# Patient Record
Sex: MALE | Race: WHITE | Employment: OTHER | URBAN - METROPOLITAN AREA
[De-identification: names, ages, dates, MRNs, and addresses within clinical notes are randomized per-mention and may not be internally consistent; named-entity substitution may affect disease eponyms.]

---

## 2017-01-24 ENCOUNTER — NURSE ONLY (OUTPATIENT)
Dept: FAMILY MEDICINE CLINIC | Age: 67
End: 2017-01-24

## 2017-01-24 DIAGNOSIS — E53.8 B12 DEFICIENCY: Primary | ICD-10-CM

## 2017-01-24 PROCEDURE — 96372 THER/PROPH/DIAG INJ SC/IM: CPT | Performed by: FAMILY MEDICINE

## 2017-01-24 RX ORDER — CYANOCOBALAMIN 1000 UG/ML
1000 INJECTION INTRAMUSCULAR; SUBCUTANEOUS ONCE
Status: COMPLETED | OUTPATIENT
Start: 2017-01-24 | End: 2017-01-24

## 2017-01-24 RX ADMIN — CYANOCOBALAMIN 1000 MCG: 1000 INJECTION INTRAMUSCULAR; SUBCUTANEOUS at 12:50

## 2017-01-25 RX ORDER — HYDROCODONE BITARTRATE AND ACETAMINOPHEN 10; 325 MG/1; MG/1
TABLET ORAL
Qty: 120 TABLET | Refills: 0 | Status: SHIPPED | OUTPATIENT
Start: 2017-01-25 | End: 2017-02-16 | Stop reason: SDUPTHER

## 2017-01-27 RX ORDER — TIZANIDINE HYDROCHLORIDE 4 MG/1
CAPSULE, GELATIN COATED ORAL
Qty: 30 CAPSULE | Refills: 0 | Status: SHIPPED | OUTPATIENT
Start: 2017-01-27 | End: 2017-03-23 | Stop reason: SDUPTHER

## 2017-02-01 ENCOUNTER — TELEPHONE (OUTPATIENT)
Dept: FAMILY MEDICINE CLINIC | Age: 67
End: 2017-02-01

## 2017-02-16 ENCOUNTER — OFFICE VISIT (OUTPATIENT)
Dept: FAMILY MEDICINE CLINIC | Age: 67
End: 2017-02-16

## 2017-02-16 VITALS
HEIGHT: 71 IN | RESPIRATION RATE: 18 BRPM | WEIGHT: 176 LBS | TEMPERATURE: 97.5 F | HEART RATE: 78 BPM | BODY MASS INDEX: 24.64 KG/M2 | SYSTOLIC BLOOD PRESSURE: 132 MMHG | OXYGEN SATURATION: 97 % | DIASTOLIC BLOOD PRESSURE: 72 MMHG

## 2017-02-16 DIAGNOSIS — G89.29 CHRONIC MIDLINE LOW BACK PAIN WITHOUT SCIATICA: Primary | ICD-10-CM

## 2017-02-16 DIAGNOSIS — M54.50 CHRONIC BILATERAL LOW BACK PAIN WITHOUT SCIATICA: ICD-10-CM

## 2017-02-16 DIAGNOSIS — I10 ESSENTIAL HYPERTENSION: ICD-10-CM

## 2017-02-16 DIAGNOSIS — M54.50 CHRONIC MIDLINE LOW BACK PAIN WITHOUT SCIATICA: Primary | ICD-10-CM

## 2017-02-16 DIAGNOSIS — G89.29 CHRONIC BILATERAL LOW BACK PAIN WITHOUT SCIATICA: ICD-10-CM

## 2017-02-16 DIAGNOSIS — F41.9 CHRONIC ANXIETY: ICD-10-CM

## 2017-02-16 DIAGNOSIS — S88.912S: ICD-10-CM

## 2017-02-16 DIAGNOSIS — E53.8 B12 DEFICIENCY: ICD-10-CM

## 2017-02-16 PROCEDURE — 99214 OFFICE O/P EST MOD 30 MIN: CPT | Performed by: FAMILY MEDICINE

## 2017-02-16 RX ORDER — HYDROCODONE BITARTRATE AND ACETAMINOPHEN 10; 325 MG/1; MG/1
TABLET ORAL
Qty: 120 TABLET | Refills: 0 | Status: SHIPPED | OUTPATIENT
Start: 2017-02-16 | End: 2017-03-21 | Stop reason: SDUPTHER

## 2017-02-16 RX ORDER — PRASUGREL HCL 10 MG
TABLET ORAL
Qty: 30 TABLET | Refills: 0 | Status: SHIPPED | OUTPATIENT
Start: 2017-02-16 | End: 2017-03-23 | Stop reason: HOSPADM

## 2017-02-16 ASSESSMENT — ENCOUNTER SYMPTOMS
ALLERGIC/IMMUNOLOGIC NEGATIVE: 1
GASTROINTESTINAL NEGATIVE: 1
EYES NEGATIVE: 1
RESPIRATORY NEGATIVE: 1
SHORTNESS OF BREATH: 0

## 2017-02-17 ENCOUNTER — TELEPHONE (OUTPATIENT)
Dept: FAMILY MEDICINE CLINIC | Age: 67
End: 2017-02-17

## 2017-02-20 ENCOUNTER — TELEPHONE (OUTPATIENT)
Dept: FAMILY MEDICINE CLINIC | Age: 67
End: 2017-02-20

## 2017-02-20 RX ORDER — TIZANIDINE 4 MG/1
4 TABLET ORAL 3 TIMES DAILY
Qty: 60 TABLET | Refills: 3 | Status: SHIPPED | OUTPATIENT
Start: 2017-02-20 | End: 2018-03-27 | Stop reason: ALTCHOICE

## 2017-03-09 ENCOUNTER — TELEPHONE (OUTPATIENT)
Dept: FAMILY MEDICINE CLINIC | Age: 67
End: 2017-03-09

## 2017-03-09 RX ORDER — VARDENAFIL HYDROCHLORIDE 10 MG/1
TABLET, FILM COATED ORAL
Qty: 30 TABLET | Refills: 3 | Status: SHIPPED | OUTPATIENT
Start: 2017-03-09 | End: 2017-03-21 | Stop reason: SDUPTHER

## 2017-03-21 ENCOUNTER — NURSE ONLY (OUTPATIENT)
Dept: FAMILY MEDICINE CLINIC | Age: 67
End: 2017-03-21

## 2017-03-21 DIAGNOSIS — E53.8 B12 DEFICIENCY: Primary | ICD-10-CM

## 2017-03-21 PROCEDURE — 96372 THER/PROPH/DIAG INJ SC/IM: CPT | Performed by: FAMILY MEDICINE

## 2017-03-21 RX ORDER — VARDENAFIL HYDROCHLORIDE 10 MG/1
TABLET ORAL
Qty: 6 TABLET | Refills: 3 | Status: SHIPPED | OUTPATIENT
Start: 2017-03-21 | End: 2017-04-06 | Stop reason: SDUPTHER

## 2017-03-21 RX ORDER — CYANOCOBALAMIN 1000 UG/ML
1000 INJECTION INTRAMUSCULAR; SUBCUTANEOUS ONCE
Status: COMPLETED | OUTPATIENT
Start: 2017-03-21 | End: 2017-03-21

## 2017-03-21 RX ORDER — HYDROCODONE BITARTRATE AND ACETAMINOPHEN 10; 325 MG/1; MG/1
TABLET ORAL
Qty: 120 TABLET | Refills: 0 | Status: SHIPPED | OUTPATIENT
Start: 2017-03-21 | End: 2017-04-06 | Stop reason: SDUPTHER

## 2017-03-21 RX ADMIN — CYANOCOBALAMIN 1000 MCG: 1000 INJECTION INTRAMUSCULAR; SUBCUTANEOUS at 11:03

## 2017-03-23 ENCOUNTER — OFFICE VISIT (OUTPATIENT)
Dept: CARDIOLOGY | Age: 67
End: 2017-03-23

## 2017-03-23 VITALS
BODY MASS INDEX: 26.1 KG/M2 | HEIGHT: 71 IN | DIASTOLIC BLOOD PRESSURE: 96 MMHG | HEART RATE: 66 BPM | SYSTOLIC BLOOD PRESSURE: 148 MMHG | WEIGHT: 186.4 LBS

## 2017-03-23 DIAGNOSIS — E78.00 ELEVATED CHOLESTEROL: ICD-10-CM

## 2017-03-23 DIAGNOSIS — E78.00 HYPERCHOLESTEREMIA: ICD-10-CM

## 2017-03-23 DIAGNOSIS — I10 ESSENTIAL HYPERTENSION: Primary | ICD-10-CM

## 2017-03-23 DIAGNOSIS — I25.10 CORONARY ARTERY DISEASE INVOLVING NATIVE CORONARY ARTERY OF NATIVE HEART WITHOUT ANGINA PECTORIS: ICD-10-CM

## 2017-03-23 PROCEDURE — 93000 ELECTROCARDIOGRAM COMPLETE: CPT | Performed by: INTERNAL MEDICINE

## 2017-03-23 PROCEDURE — 99213 OFFICE O/P EST LOW 20 MIN: CPT | Performed by: INTERNAL MEDICINE

## 2017-03-23 RX ORDER — PRASUGREL 10 MG/1
10 TABLET, FILM COATED ORAL DAILY
Qty: 30 TABLET | Refills: 6 | Status: CANCELLED | OUTPATIENT
Start: 2017-03-23

## 2017-04-06 ENCOUNTER — NURSE ONLY (OUTPATIENT)
Dept: FAMILY MEDICINE CLINIC | Age: 67
End: 2017-04-06

## 2017-04-06 DIAGNOSIS — E53.8 B12 DEFICIENCY: Primary | ICD-10-CM

## 2017-04-06 PROCEDURE — 96372 THER/PROPH/DIAG INJ SC/IM: CPT | Performed by: FAMILY MEDICINE

## 2017-04-06 RX ORDER — CYANOCOBALAMIN 1000 UG/ML
1000 INJECTION INTRAMUSCULAR; SUBCUTANEOUS ONCE
Status: COMPLETED | OUTPATIENT
Start: 2017-04-06 | End: 2017-04-06

## 2017-04-06 RX ORDER — VARDENAFIL HYDROCHLORIDE 10 MG/1
TABLET ORAL
Qty: 6 TABLET | Refills: 3 | Status: SHIPPED | OUTPATIENT
Start: 2017-04-06 | End: 2017-11-06 | Stop reason: SDUPTHER

## 2017-04-06 RX ORDER — HYDROCODONE BITARTRATE AND ACETAMINOPHEN 10; 325 MG/1; MG/1
TABLET ORAL
Qty: 120 TABLET | Refills: 0 | Status: SHIPPED | OUTPATIENT
Start: 2017-04-06 | End: 2017-04-27 | Stop reason: SDUPTHER

## 2017-04-06 RX ADMIN — CYANOCOBALAMIN 1000 MCG: 1000 INJECTION INTRAMUSCULAR; SUBCUTANEOUS at 14:53

## 2017-05-01 RX ORDER — HYDROCODONE BITARTRATE AND ACETAMINOPHEN 10; 325 MG/1; MG/1
TABLET ORAL
Qty: 120 TABLET | Refills: 0 | Status: SHIPPED | OUTPATIENT
Start: 2017-05-01 | End: 2017-05-30 | Stop reason: SDUPTHER

## 2017-05-04 ENCOUNTER — NURSE ONLY (OUTPATIENT)
Dept: FAMILY MEDICINE CLINIC | Age: 67
End: 2017-05-04

## 2017-05-04 DIAGNOSIS — E53.8 B12 DEFICIENCY: Primary | ICD-10-CM

## 2017-05-04 PROCEDURE — 96372 THER/PROPH/DIAG INJ SC/IM: CPT | Performed by: FAMILY MEDICINE

## 2017-05-04 RX ORDER — CYANOCOBALAMIN 1000 UG/ML
1000 INJECTION INTRAMUSCULAR; SUBCUTANEOUS ONCE
Status: COMPLETED | OUTPATIENT
Start: 2017-05-04 | End: 2017-05-04

## 2017-05-04 RX ADMIN — CYANOCOBALAMIN 1000 MCG: 1000 INJECTION INTRAMUSCULAR; SUBCUTANEOUS at 10:26

## 2017-05-20 RX ORDER — HYDROCODONE BITARTRATE AND ACETAMINOPHEN 10; 325 MG/1; MG/1
TABLET ORAL
Qty: 120 TABLET | Refills: 0 | Status: CANCELLED | OUTPATIENT
Start: 2017-05-20

## 2017-05-26 ENCOUNTER — NURSE ONLY (OUTPATIENT)
Dept: FAMILY MEDICINE CLINIC | Age: 67
End: 2017-05-26

## 2017-05-26 DIAGNOSIS — E53.8 B12 DEFICIENCY: Primary | ICD-10-CM

## 2017-05-26 PROCEDURE — 96372 THER/PROPH/DIAG INJ SC/IM: CPT | Performed by: FAMILY MEDICINE

## 2017-05-26 RX ORDER — CYANOCOBALAMIN 1000 UG/ML
1000 INJECTION INTRAMUSCULAR; SUBCUTANEOUS ONCE
Status: COMPLETED | OUTPATIENT
Start: 2017-05-26 | End: 2017-05-26

## 2017-05-26 RX ADMIN — CYANOCOBALAMIN 1000 MCG: 1000 INJECTION INTRAMUSCULAR; SUBCUTANEOUS at 15:59

## 2017-05-30 ENCOUNTER — OFFICE VISIT (OUTPATIENT)
Dept: FAMILY MEDICINE CLINIC | Age: 67
End: 2017-05-30

## 2017-05-30 VITALS
SYSTOLIC BLOOD PRESSURE: 132 MMHG | HEIGHT: 71 IN | DIASTOLIC BLOOD PRESSURE: 66 MMHG | BODY MASS INDEX: 25.9 KG/M2 | TEMPERATURE: 97.2 F | WEIGHT: 185 LBS | HEART RATE: 72 BPM | RESPIRATION RATE: 16 BRPM

## 2017-05-30 DIAGNOSIS — F32.A DEPRESSION, UNSPECIFIED DEPRESSION TYPE: ICD-10-CM

## 2017-05-30 DIAGNOSIS — G62.9 NEUROPATHY: Primary | ICD-10-CM

## 2017-05-30 DIAGNOSIS — G89.29 CHRONIC BILATERAL LOW BACK PAIN WITHOUT SCIATICA: ICD-10-CM

## 2017-05-30 DIAGNOSIS — I10 ESSENTIAL HYPERTENSION: ICD-10-CM

## 2017-05-30 DIAGNOSIS — E78.00 HYPERCHOLESTEREMIA: ICD-10-CM

## 2017-05-30 DIAGNOSIS — F41.9 CHRONIC ANXIETY: ICD-10-CM

## 2017-05-30 DIAGNOSIS — M54.50 CHRONIC BILATERAL LOW BACK PAIN WITHOUT SCIATICA: ICD-10-CM

## 2017-05-30 PROCEDURE — 99213 OFFICE O/P EST LOW 20 MIN: CPT | Performed by: FAMILY MEDICINE

## 2017-05-30 RX ORDER — HYDROCODONE BITARTRATE AND ACETAMINOPHEN 10; 325 MG/1; MG/1
1 TABLET ORAL EVERY 6 HOURS PRN
Qty: 120 TABLET | Refills: 0 | Status: SHIPPED | OUTPATIENT
Start: 2017-05-30 | End: 2017-06-27 | Stop reason: SDUPTHER

## 2017-05-30 ASSESSMENT — ENCOUNTER SYMPTOMS
RESPIRATORY NEGATIVE: 1
SHORTNESS OF BREATH: 0
GASTROINTESTINAL NEGATIVE: 1
ALLERGIC/IMMUNOLOGIC NEGATIVE: 1
EYES NEGATIVE: 1

## 2017-05-31 ENCOUNTER — TELEPHONE (OUTPATIENT)
Dept: FAMILY MEDICINE CLINIC | Age: 67
End: 2017-05-31

## 2017-06-27 DIAGNOSIS — G89.29 CHRONIC BILATERAL LOW BACK PAIN WITHOUT SCIATICA: ICD-10-CM

## 2017-06-27 DIAGNOSIS — M54.50 CHRONIC BILATERAL LOW BACK PAIN WITHOUT SCIATICA: ICD-10-CM

## 2017-06-27 DIAGNOSIS — G62.9 NEUROPATHY: ICD-10-CM

## 2017-06-28 RX ORDER — HYDROCODONE BITARTRATE AND ACETAMINOPHEN 10; 325 MG/1; MG/1
1 TABLET ORAL EVERY 6 HOURS PRN
Qty: 120 TABLET | Refills: 0 | Status: SHIPPED | OUTPATIENT
Start: 2017-06-28 | End: 2017-07-17 | Stop reason: SDUPTHER

## 2017-06-29 PROBLEM — Z79.899 CONTROLLED SUBSTANCE AGREEMENT SIGNED: Status: ACTIVE | Noted: 2017-06-29

## 2017-06-30 ENCOUNTER — NURSE ONLY (OUTPATIENT)
Dept: FAMILY MEDICINE CLINIC | Age: 67
End: 2017-06-30

## 2017-06-30 DIAGNOSIS — E53.8 B12 DEFICIENCY: Primary | ICD-10-CM

## 2017-06-30 PROCEDURE — 96372 THER/PROPH/DIAG INJ SC/IM: CPT | Performed by: FAMILY MEDICINE

## 2017-06-30 RX ORDER — CYANOCOBALAMIN 1000 UG/ML
1000 INJECTION INTRAMUSCULAR; SUBCUTANEOUS ONCE
Status: COMPLETED | OUTPATIENT
Start: 2017-06-30 | End: 2017-06-30

## 2017-06-30 RX ADMIN — CYANOCOBALAMIN 1000 MCG: 1000 INJECTION INTRAMUSCULAR; SUBCUTANEOUS at 09:16

## 2017-07-17 DIAGNOSIS — G62.9 NEUROPATHY: ICD-10-CM

## 2017-07-17 DIAGNOSIS — M54.50 CHRONIC BILATERAL LOW BACK PAIN WITHOUT SCIATICA: ICD-10-CM

## 2017-07-17 DIAGNOSIS — G89.29 CHRONIC BILATERAL LOW BACK PAIN WITHOUT SCIATICA: ICD-10-CM

## 2017-07-17 RX ORDER — HYDROCODONE BITARTRATE AND ACETAMINOPHEN 10; 325 MG/1; MG/1
1 TABLET ORAL EVERY 6 HOURS PRN
Qty: 120 TABLET | Refills: 0 | Status: SHIPPED | OUTPATIENT
Start: 2017-07-17 | End: 2017-08-29 | Stop reason: SDUPTHER

## 2017-07-28 ENCOUNTER — NURSE ONLY (OUTPATIENT)
Dept: FAMILY MEDICINE CLINIC | Age: 67
End: 2017-07-28

## 2017-07-28 DIAGNOSIS — E53.8 B12 DEFICIENCY: Primary | ICD-10-CM

## 2017-07-28 PROCEDURE — 96372 THER/PROPH/DIAG INJ SC/IM: CPT | Performed by: FAMILY MEDICINE

## 2017-07-28 RX ORDER — CYANOCOBALAMIN 1000 UG/ML
1000 INJECTION INTRAMUSCULAR; SUBCUTANEOUS ONCE
Status: COMPLETED | OUTPATIENT
Start: 2017-07-28 | End: 2017-07-28

## 2017-07-28 RX ADMIN — CYANOCOBALAMIN 1000 MCG: 1000 INJECTION INTRAMUSCULAR; SUBCUTANEOUS at 15:04

## 2017-08-29 ENCOUNTER — OFFICE VISIT (OUTPATIENT)
Dept: FAMILY MEDICINE CLINIC | Age: 67
End: 2017-08-29

## 2017-08-29 VITALS
OXYGEN SATURATION: 98 % | SYSTOLIC BLOOD PRESSURE: 138 MMHG | HEIGHT: 71 IN | HEART RATE: 74 BPM | WEIGHT: 173 LBS | BODY MASS INDEX: 24.22 KG/M2 | TEMPERATURE: 98.1 F | RESPIRATION RATE: 18 BRPM | DIASTOLIC BLOOD PRESSURE: 84 MMHG

## 2017-08-29 DIAGNOSIS — G62.9 NEUROPATHY: ICD-10-CM

## 2017-08-29 DIAGNOSIS — G89.29 CHRONIC BILATERAL LOW BACK PAIN WITHOUT SCIATICA: ICD-10-CM

## 2017-08-29 DIAGNOSIS — M54.50 CHRONIC BILATERAL LOW BACK PAIN WITHOUT SCIATICA: ICD-10-CM

## 2017-08-29 DIAGNOSIS — E53.8 B12 DEFICIENCY: Primary | ICD-10-CM

## 2017-08-29 DIAGNOSIS — G89.29 CHRONIC MIDLINE LOW BACK PAIN WITHOUT SCIATICA: ICD-10-CM

## 2017-08-29 DIAGNOSIS — M54.50 CHRONIC MIDLINE LOW BACK PAIN WITHOUT SCIATICA: ICD-10-CM

## 2017-08-29 DIAGNOSIS — M15.9 PRIMARY OSTEOARTHRITIS INVOLVING MULTIPLE JOINTS: ICD-10-CM

## 2017-08-29 PROCEDURE — 99213 OFFICE O/P EST LOW 20 MIN: CPT | Performed by: FAMILY MEDICINE

## 2017-08-29 PROCEDURE — 96372 THER/PROPH/DIAG INJ SC/IM: CPT | Performed by: FAMILY MEDICINE

## 2017-08-29 RX ORDER — CYANOCOBALAMIN 1000 UG/ML
1000 INJECTION INTRAMUSCULAR; SUBCUTANEOUS ONCE
Status: COMPLETED | OUTPATIENT
Start: 2017-08-29 | End: 2017-08-29

## 2017-08-29 RX ORDER — HYDROCODONE BITARTRATE AND ACETAMINOPHEN 10; 325 MG/1; MG/1
1 TABLET ORAL EVERY 6 HOURS PRN
Qty: 120 TABLET | Refills: 0 | Status: SHIPPED | OUTPATIENT
Start: 2017-08-29 | End: 2017-09-19 | Stop reason: SDUPTHER

## 2017-08-29 RX ADMIN — CYANOCOBALAMIN 1000 MCG: 1000 INJECTION INTRAMUSCULAR; SUBCUTANEOUS at 10:29

## 2017-08-29 ASSESSMENT — ENCOUNTER SYMPTOMS
GASTROINTESTINAL NEGATIVE: 1
EYES NEGATIVE: 1
SHORTNESS OF BREATH: 0
RESPIRATORY NEGATIVE: 1
BACK PAIN: 1
ALLERGIC/IMMUNOLOGIC NEGATIVE: 1

## 2017-08-29 ASSESSMENT — PATIENT HEALTH QUESTIONNAIRE - PHQ9
2. FEELING DOWN, DEPRESSED OR HOPELESS: 0
SUM OF ALL RESPONSES TO PHQ9 QUESTIONS 1 & 2: 0
SUM OF ALL RESPONSES TO PHQ QUESTIONS 1-9: 0
1. LITTLE INTEREST OR PLEASURE IN DOING THINGS: 0

## 2017-08-30 ENCOUNTER — APPOINTMENT (OUTPATIENT)
Dept: GENERAL RADIOLOGY | Age: 67
End: 2017-08-30
Payer: MEDICARE

## 2017-08-30 ENCOUNTER — HOSPITAL ENCOUNTER (EMERGENCY)
Age: 67
Discharge: HOME | End: 2017-08-30
Attending: EMERGENCY MEDICINE
Payer: MEDICARE

## 2017-08-30 VITALS
RESPIRATION RATE: 18 BRPM | SYSTOLIC BLOOD PRESSURE: 141 MMHG | HEART RATE: 50 BPM | WEIGHT: 180 LBS | TEMPERATURE: 97.2 F | HEIGHT: 74 IN | DIASTOLIC BLOOD PRESSURE: 96 MMHG | BODY MASS INDEX: 23.1 KG/M2 | OXYGEN SATURATION: 95 %

## 2017-08-30 DIAGNOSIS — R61 DIAPHORESIS: Primary | ICD-10-CM

## 2017-08-30 LAB
ALBUMIN SERPL-MCNC: 3.9 G/DL (ref 3.9–4.9)
ALP BLD-CCNC: 84 U/L (ref 35–104)
ALT SERPL-CCNC: 12 U/L (ref 0–41)
ANION GAP SERPL CALCULATED.3IONS-SCNC: 12 MEQ/L (ref 7–13)
APTT: 30.2 SEC (ref 21.6–35.4)
AST SERPL-CCNC: 17 U/L (ref 0–40)
BASOPHILS ABSOLUTE: 0.1 K/UL (ref 0–0.2)
BASOPHILS RELATIVE PERCENT: 1.1 %
BILIRUB SERPL-MCNC: 0.4 MG/DL (ref 0–1.2)
BUN BLDV-MCNC: 16 MG/DL (ref 8–23)
CALCIUM SERPL-MCNC: 8.7 MG/DL (ref 8.6–10.2)
CHLORIDE BLD-SCNC: 102 MEQ/L (ref 98–107)
CO2: 25 MEQ/L (ref 22–29)
CREAT SERPL-MCNC: 0.61 MG/DL (ref 0.7–1.2)
EOSINOPHILS ABSOLUTE: 0.3 K/UL (ref 0–0.7)
EOSINOPHILS RELATIVE PERCENT: 3.9 %
GFR AFRICAN AMERICAN: >60
GFR NON-AFRICAN AMERICAN: >60
GLOBULIN: 2.7 G/DL (ref 2.3–3.5)
GLUCOSE BLD-MCNC: 82 MG/DL (ref 74–109)
HCT VFR BLD CALC: 44.7 % (ref 42–52)
HEMOGLOBIN: 15 G/DL (ref 14–18)
INR BLD: 1
LYMPHOCYTES ABSOLUTE: 2.9 K/UL (ref 1–4.8)
LYMPHOCYTES RELATIVE PERCENT: 37.1 %
MCH RBC QN AUTO: 30.2 PG (ref 27–31.3)
MCHC RBC AUTO-ENTMCNC: 33.5 % (ref 33–37)
MCV RBC AUTO: 90.2 FL (ref 80–100)
MONOCYTES ABSOLUTE: 0.8 K/UL (ref 0.2–0.8)
MONOCYTES RELATIVE PERCENT: 9.7 %
NEUTROPHILS ABSOLUTE: 3.7 K/UL (ref 1.4–6.5)
NEUTROPHILS RELATIVE PERCENT: 48.2 %
PDW BLD-RTO: 13.1 % (ref 11.5–14.5)
PLATELET # BLD: 220 K/UL (ref 130–400)
POTASSIUM SERPL-SCNC: 4.2 MEQ/L (ref 3.5–5.1)
PROTHROMBIN TIME: 10.7 SEC (ref 8.1–13.7)
RBC # BLD: 4.96 M/UL (ref 4.7–6.1)
SODIUM BLD-SCNC: 139 MEQ/L (ref 132–144)
TOTAL CK: 96 U/L (ref 0–190)
TOTAL PROTEIN: 6.6 G/DL (ref 6.4–8.1)
TROPONIN: <0.01 NG/ML (ref 0–0.01)
TROPONIN: <0.01 NG/ML (ref 0–0.01)
WBC # BLD: 7.7 K/UL (ref 4.8–10.8)

## 2017-08-30 PROCEDURE — 82550 ASSAY OF CK (CPK): CPT

## 2017-08-30 PROCEDURE — 84484 ASSAY OF TROPONIN QUANT: CPT

## 2017-08-30 PROCEDURE — 71010 XR CHEST PORTABLE: CPT

## 2017-08-30 PROCEDURE — 85025 COMPLETE CBC W/AUTO DIFF WBC: CPT

## 2017-08-30 PROCEDURE — 99284 EMERGENCY DEPT VISIT MOD MDM: CPT

## 2017-08-30 PROCEDURE — 80053 COMPREHEN METABOLIC PANEL: CPT

## 2017-08-30 PROCEDURE — 85730 THROMBOPLASTIN TIME PARTIAL: CPT

## 2017-08-30 PROCEDURE — 36415 COLL VENOUS BLD VENIPUNCTURE: CPT

## 2017-08-30 PROCEDURE — 85610 PROTHROMBIN TIME: CPT

## 2017-08-30 PROCEDURE — 93005 ELECTROCARDIOGRAM TRACING: CPT

## 2017-08-30 ASSESSMENT — ENCOUNTER SYMPTOMS
EYE DISCHARGE: 0
RHINORRHEA: 0
ABDOMINAL PAIN: 0
VOMITING: 0
COLOR CHANGE: 0
SHORTNESS OF BREATH: 0
FACIAL SWELLING: 0

## 2017-09-06 LAB
EKG ATRIAL RATE: 54 BPM
EKG P AXIS: 27 DEGREES
EKG P-R INTERVAL: 142 MS
EKG Q-T INTERVAL: 454 MS
EKG QRS DURATION: 88 MS
EKG QTC CALCULATION (BAZETT): 430 MS
EKG R AXIS: 58 DEGREES
EKG T AXIS: 41 DEGREES
EKG VENTRICULAR RATE: 54 BPM

## 2017-09-13 ENCOUNTER — CARE COORDINATION (OUTPATIENT)
Dept: CARE COORDINATION | Age: 67
End: 2017-09-13

## 2017-09-19 ENCOUNTER — OFFICE VISIT (OUTPATIENT)
Dept: CARDIOLOGY | Age: 67
End: 2017-09-19

## 2017-09-19 VITALS
RESPIRATION RATE: 16 BRPM | WEIGHT: 176 LBS | BODY MASS INDEX: 22.6 KG/M2 | DIASTOLIC BLOOD PRESSURE: 72 MMHG | SYSTOLIC BLOOD PRESSURE: 128 MMHG | HEART RATE: 66 BPM | OXYGEN SATURATION: 96 %

## 2017-09-19 DIAGNOSIS — G89.29 CHRONIC BILATERAL LOW BACK PAIN WITHOUT SCIATICA: ICD-10-CM

## 2017-09-19 DIAGNOSIS — G62.9 NEUROPATHY: ICD-10-CM

## 2017-09-19 DIAGNOSIS — I25.10 CORONARY ARTERY DISEASE INVOLVING NATIVE CORONARY ARTERY OF NATIVE HEART WITHOUT ANGINA PECTORIS: ICD-10-CM

## 2017-09-19 DIAGNOSIS — E78.00 HYPERCHOLESTEREMIA: ICD-10-CM

## 2017-09-19 DIAGNOSIS — F33.42 RECURRENT MAJOR DEPRESSIVE EPISODES, IN FULL REMISSION (HCC): ICD-10-CM

## 2017-09-19 DIAGNOSIS — I10 ESSENTIAL HYPERTENSION: Primary | ICD-10-CM

## 2017-09-19 DIAGNOSIS — M54.50 CHRONIC BILATERAL LOW BACK PAIN WITHOUT SCIATICA: ICD-10-CM

## 2017-09-19 PROCEDURE — 99213 OFFICE O/P EST LOW 20 MIN: CPT | Performed by: INTERNAL MEDICINE

## 2017-09-19 RX ORDER — DONEPEZIL HYDROCHLORIDE 10 MG/1
10 TABLET, FILM COATED ORAL NIGHTLY
Refills: 5 | COMMUNITY
Start: 2017-09-12 | End: 2019-10-24

## 2017-09-19 RX ORDER — HYDROCODONE BITARTRATE AND ACETAMINOPHEN 10; 325 MG/1; MG/1
1 TABLET ORAL EVERY 6 HOURS PRN
Qty: 120 TABLET | Refills: 0 | Status: SHIPPED | OUTPATIENT
Start: 2017-09-19 | End: 2017-09-25 | Stop reason: SDUPTHER

## 2017-09-20 ENCOUNTER — NURSE ONLY (OUTPATIENT)
Dept: FAMILY MEDICINE CLINIC | Age: 67
End: 2017-09-20

## 2017-09-20 DIAGNOSIS — E53.8 B12 DEFICIENCY: Primary | ICD-10-CM

## 2017-09-20 PROCEDURE — 96372 THER/PROPH/DIAG INJ SC/IM: CPT | Performed by: FAMILY MEDICINE

## 2017-09-20 RX ORDER — CYANOCOBALAMIN 1000 UG/ML
1000 INJECTION INTRAMUSCULAR; SUBCUTANEOUS ONCE
Status: COMPLETED | OUTPATIENT
Start: 2017-09-20 | End: 2017-09-20

## 2017-09-20 RX ADMIN — CYANOCOBALAMIN 1000 MCG: 1000 INJECTION INTRAMUSCULAR; SUBCUTANEOUS at 11:39

## 2017-09-25 DIAGNOSIS — G62.9 NEUROPATHY: ICD-10-CM

## 2017-09-25 DIAGNOSIS — G89.29 CHRONIC BILATERAL LOW BACK PAIN WITHOUT SCIATICA: ICD-10-CM

## 2017-09-25 DIAGNOSIS — M54.50 CHRONIC BILATERAL LOW BACK PAIN WITHOUT SCIATICA: ICD-10-CM

## 2017-09-25 RX ORDER — HYDROCODONE BITARTRATE AND ACETAMINOPHEN 10; 325 MG/1; MG/1
1 TABLET ORAL EVERY 6 HOURS PRN
Qty: 120 TABLET | Refills: 0 | Status: SHIPPED | OUTPATIENT
Start: 2017-09-25 | End: 2017-10-12 | Stop reason: SDUPTHER

## 2017-10-12 DIAGNOSIS — G62.9 NEUROPATHY: ICD-10-CM

## 2017-10-12 DIAGNOSIS — G89.29 CHRONIC BILATERAL LOW BACK PAIN WITHOUT SCIATICA: ICD-10-CM

## 2017-10-12 DIAGNOSIS — M54.50 CHRONIC BILATERAL LOW BACK PAIN WITHOUT SCIATICA: ICD-10-CM

## 2017-10-15 RX ORDER — HYDROCODONE BITARTRATE AND ACETAMINOPHEN 10; 325 MG/1; MG/1
1 TABLET ORAL EVERY 6 HOURS PRN
Qty: 120 TABLET | Refills: 0 | Status: SHIPPED | OUTPATIENT
Start: 2017-10-15 | End: 2017-10-27 | Stop reason: SDUPTHER

## 2017-10-18 ENCOUNTER — CARE COORDINATION (OUTPATIENT)
Dept: CARE COORDINATION | Age: 67
End: 2017-10-18

## 2017-10-18 NOTE — CARE COORDINATION
Ambulatory Care Coordination Note  10/18/2017  CM Risk Score: 4  Dajuan Mortality Risk Score: 2.25    ACC: Ayaan Rush, RN    Summary Note: Contacted pt to follow up on enrollment in [facility]. Pt states he feel she is doing well. He states he understands his medications and is able to describe S/S to report. Pt declined enrollment at this time as he feel she has no ACC needs. Prior to Admission medications    Medication Sig Start Date End Date Taking? Authorizing Provider   HYDROcodone-acetaminophen (NORCO)  MG per tablet Take 1 tablet by mouth every 6 hours as needed for Pain . Earliest Fill Date: 10/15/17 10/15/17 11/14/17  [address], MD   Handicap Placard MISC by Does not apply route Duration of 5 years.   Dx.Lumbar pain,osteoarthritis 10/15/17   Mayra Mazariegos MD   donepezil (ARICEPT) 10 MG tablet 10 mg nightly 9/12/17   Historical Provider, MD   vardenafil (LEVITRA) 10 MG tablet Take 1 tablet by mouth as needed for Erectile Dysfunction 4/6/17   [address], MD   tiZANidine (ZANAFLEX) 4 MG tablet Take 1 tablet by mouth 3 times daily 2/20/17   Mayra Mazariegos MD   atorvastatin (LIPITOR) 80 MG tablet Take 1 tablet by mouth nightly 12/30/16   Mayra Mazariegos MD   carvedilol (COREG) 3.125 MG tablet Take 1 tablet by mouth 2 times daily 12/30/16   [address], MD   losartan (COZAAR) 25 MG tablet Take 1 tablet by mouth daily 12/30/16   [address], MD   amLODIPine (NORVASC) 5 MG tablet Take 1 tablet by mouth daily 12/29/16   Mayra Mazariegos MD   orphenadrine (NORFLEX) 100 MG extended release tablet Take 1 tablet by mouth 2 times daily for 10 days 12/20/16   Mayra Mazariegos MD   nitroGLYCERIN (NITROSTAT) 0.4 MG SL tablet Place 1 tablet under the tongue every 5 minutes as needed for Chest pain 8/19/16   Gaby Grant, DO   ALPRAZolam Zeinab Doll) 0.5 MG tablet Take 1 tablet by mouth nightly as needed for Sleep 4/22/16   [address], MD   Bee Pollen-Propolis-RoyalJelly TABS Take  by mouth. 3/29/13 Historical Provider, MD   aspirin 81 MG tablet Take 81 mg by mouth daily.     Historical Provider, MD       Future Appointments  Date Time Provider Justin Bhargavi   3/13/2018 10:30 AM Sanchez Dyer DO [facility]

## 2017-10-27 DIAGNOSIS — G62.9 NEUROPATHY: ICD-10-CM

## 2017-10-27 DIAGNOSIS — G89.29 CHRONIC BILATERAL LOW BACK PAIN WITHOUT SCIATICA: ICD-10-CM

## 2017-10-27 DIAGNOSIS — M54.50 CHRONIC BILATERAL LOW BACK PAIN WITHOUT SCIATICA: ICD-10-CM

## 2017-10-27 RX ORDER — HYDROCODONE BITARTRATE AND ACETAMINOPHEN 10; 325 MG/1; MG/1
1 TABLET ORAL EVERY 6 HOURS PRN
Qty: 120 TABLET | Refills: 0 | Status: SHIPPED | OUTPATIENT
Start: 2017-10-27 | End: 2017-12-26 | Stop reason: SDUPTHER

## 2017-11-06 RX ORDER — VARDENAFIL HYDROCHLORIDE 10 MG/1
TABLET ORAL
Qty: 6 TABLET | Refills: 3 | Status: SHIPPED | OUTPATIENT
Start: 2017-11-06 | End: 2017-11-10 | Stop reason: SDUPTHER

## 2017-11-09 ENCOUNTER — TELEPHONE (OUTPATIENT)
Dept: FAMILY MEDICINE CLINIC | Age: 67
End: 2017-11-09

## 2017-11-09 NOTE — TELEPHONE ENCOUNTER
Pt  norco rx on 11/1/17 but he just filled 30 day supply 10/25/17. He is not due for rx for few more weeks rx written 10/27/17 will not be valid due to new laws. Pt aware will call when due.

## 2017-11-10 DIAGNOSIS — M54.50 CHRONIC BILATERAL LOW BACK PAIN WITHOUT SCIATICA: ICD-10-CM

## 2017-11-10 DIAGNOSIS — G62.9 NEUROPATHY: ICD-10-CM

## 2017-11-10 DIAGNOSIS — G89.29 CHRONIC BILATERAL LOW BACK PAIN WITHOUT SCIATICA: ICD-10-CM

## 2017-11-10 RX ORDER — HYDROCODONE BITARTRATE AND ACETAMINOPHEN 10; 325 MG/1; MG/1
1 TABLET ORAL EVERY 6 HOURS PRN
Qty: 120 TABLET | Refills: 0 | OUTPATIENT
Start: 2017-11-10 | End: 2017-12-10

## 2017-11-13 RX ORDER — VARDENAFIL HYDROCHLORIDE 10 MG/1
TABLET ORAL
Qty: 6 TABLET | Refills: 3 | Status: SHIPPED | OUTPATIENT
Start: 2017-11-13 | End: 2019-10-24

## 2017-12-26 DIAGNOSIS — G89.29 CHRONIC BILATERAL LOW BACK PAIN WITHOUT SCIATICA: ICD-10-CM

## 2017-12-26 DIAGNOSIS — G62.9 NEUROPATHY: ICD-10-CM

## 2017-12-26 DIAGNOSIS — M54.50 CHRONIC BILATERAL LOW BACK PAIN WITHOUT SCIATICA: ICD-10-CM

## 2017-12-26 RX ORDER — HYDROCODONE BITARTRATE AND ACETAMINOPHEN 10; 325 MG/1; MG/1
1 TABLET ORAL EVERY 6 HOURS PRN
Qty: 120 TABLET | Refills: 0 | Status: SHIPPED | OUTPATIENT
Start: 2017-12-26 | End: 2018-01-29 | Stop reason: SDUPTHER

## 2017-12-27 ENCOUNTER — NURSE ONLY (OUTPATIENT)
Dept: FAMILY MEDICINE CLINIC | Age: 67
End: 2017-12-27

## 2017-12-27 DIAGNOSIS — E53.8 B12 DEFICIENCY: Primary | ICD-10-CM

## 2017-12-27 PROCEDURE — 96372 THER/PROPH/DIAG INJ SC/IM: CPT | Performed by: FAMILY MEDICINE

## 2017-12-27 RX ORDER — CYANOCOBALAMIN 1000 UG/ML
1000 INJECTION INTRAMUSCULAR; SUBCUTANEOUS ONCE
Status: COMPLETED | OUTPATIENT
Start: 2017-12-27 | End: 2017-12-27

## 2017-12-27 RX ADMIN — CYANOCOBALAMIN 1000 MCG: 1000 INJECTION INTRAMUSCULAR; SUBCUTANEOUS at 15:06

## 2018-01-08 RX ORDER — CARVEDILOL 3.12 MG/1
3.12 TABLET ORAL 2 TIMES DAILY
Qty: 60 TABLET | Refills: 5 | Status: SHIPPED | OUTPATIENT
Start: 2018-01-08 | End: 2018-07-06 | Stop reason: SDUPTHER

## 2018-01-12 ENCOUNTER — NURSE ONLY (OUTPATIENT)
Dept: FAMILY MEDICINE CLINIC | Age: 68
End: 2018-01-12

## 2018-01-12 DIAGNOSIS — G62.9 NEUROPATHY: ICD-10-CM

## 2018-01-12 DIAGNOSIS — G89.29 CHRONIC BILATERAL LOW BACK PAIN WITHOUT SCIATICA: ICD-10-CM

## 2018-01-12 DIAGNOSIS — M54.50 CHRONIC BILATERAL LOW BACK PAIN WITHOUT SCIATICA: ICD-10-CM

## 2018-01-12 DIAGNOSIS — E53.8 B12 DEFICIENCY: Primary | ICD-10-CM

## 2018-01-12 PROCEDURE — 96372 THER/PROPH/DIAG INJ SC/IM: CPT | Performed by: FAMILY MEDICINE

## 2018-01-12 RX ORDER — HYDROCODONE BITARTRATE AND ACETAMINOPHEN 10; 325 MG/1; MG/1
1 TABLET ORAL EVERY 6 HOURS PRN
Qty: 120 TABLET | Refills: 0 | OUTPATIENT
Start: 2018-01-12 | End: 2018-02-11

## 2018-01-12 RX ORDER — CYANOCOBALAMIN 1000 UG/ML
1000 INJECTION INTRAMUSCULAR; SUBCUTANEOUS ONCE
Status: COMPLETED | OUTPATIENT
Start: 2018-01-12 | End: 2018-01-12

## 2018-01-12 RX ADMIN — CYANOCOBALAMIN 1000 MCG: 1000 INJECTION INTRAMUSCULAR; SUBCUTANEOUS at 13:19

## 2018-01-12 NOTE — TELEPHONE ENCOUNTER
Patient has only 3 pills left and knows Dr. Prasad Palm is on vacation and will possibly only get partial supply.

## 2018-01-29 ENCOUNTER — NURSE ONLY (OUTPATIENT)
Dept: FAMILY MEDICINE CLINIC | Age: 68
End: 2018-01-29
Payer: MEDICARE

## 2018-01-29 DIAGNOSIS — M54.50 CHRONIC BILATERAL LOW BACK PAIN WITHOUT SCIATICA: ICD-10-CM

## 2018-01-29 DIAGNOSIS — E53.8 B12 DEFICIENCY: Primary | ICD-10-CM

## 2018-01-29 DIAGNOSIS — G89.29 CHRONIC BILATERAL LOW BACK PAIN WITHOUT SCIATICA: ICD-10-CM

## 2018-01-29 DIAGNOSIS — G62.9 NEUROPATHY: ICD-10-CM

## 2018-01-29 PROCEDURE — 96372 THER/PROPH/DIAG INJ SC/IM: CPT | Performed by: FAMILY MEDICINE

## 2018-01-29 RX ORDER — AMLODIPINE BESYLATE 5 MG/1
TABLET ORAL
Qty: 90 TABLET | Refills: 1 | Status: SHIPPED | OUTPATIENT
Start: 2018-01-29 | End: 2018-06-20 | Stop reason: SDUPTHER

## 2018-01-29 RX ORDER — CYANOCOBALAMIN 1000 UG/ML
1000 INJECTION INTRAMUSCULAR; SUBCUTANEOUS ONCE
Status: COMPLETED | OUTPATIENT
Start: 2018-01-29 | End: 2018-01-29

## 2018-01-29 RX ORDER — HYDROCODONE BITARTRATE AND ACETAMINOPHEN 10; 325 MG/1; MG/1
1 TABLET ORAL EVERY 6 HOURS PRN
Qty: 120 TABLET | Refills: 0 | Status: SHIPPED | OUTPATIENT
Start: 2018-01-29 | End: 2018-02-19 | Stop reason: SDUPTHER

## 2018-01-29 RX ADMIN — CYANOCOBALAMIN 1000 MCG: 1000 INJECTION INTRAMUSCULAR; SUBCUTANEOUS at 11:59

## 2018-01-31 ENCOUNTER — TELEPHONE (OUTPATIENT)
Dept: OTHER | Facility: CLINIC | Age: 68
End: 2018-01-31

## 2018-01-31 NOTE — TELEPHONE ENCOUNTER
[phone] (home)       Called Juana Zhao to set him up for a [employer] account. Left message for pt to return call.          Charmayne Lord      Patient Care Team:  Fela Barnard MD as PCP - General (Family Medicine)  Rosemary Nelson DO (Cardiology)  Lupillo Moran MD (General Surgery)

## 2018-02-10 RX ORDER — ATORVASTATIN CALCIUM 80 MG/1
80 TABLET, FILM COATED ORAL NIGHTLY
Qty: 90 TABLET | Refills: 3 | Status: SHIPPED | OUTPATIENT
Start: 2018-02-10 | End: 2019-02-21 | Stop reason: SDUPTHER

## 2018-02-19 ENCOUNTER — TELEPHONE (OUTPATIENT)
Dept: FAMILY MEDICINE CLINIC | Age: 68
End: 2018-02-19

## 2018-02-19 DIAGNOSIS — G89.29 CHRONIC BILATERAL LOW BACK PAIN WITHOUT SCIATICA: ICD-10-CM

## 2018-02-19 DIAGNOSIS — G62.9 NEUROPATHY: ICD-10-CM

## 2018-02-19 DIAGNOSIS — M54.50 CHRONIC BILATERAL LOW BACK PAIN WITHOUT SCIATICA: ICD-10-CM

## 2018-02-19 RX ORDER — HYDROCODONE BITARTRATE AND ACETAMINOPHEN 10; 325 MG/1; MG/1
1 TABLET ORAL EVERY 6 HOURS PRN
Qty: 120 TABLET | Refills: 0 | Status: SHIPPED | OUTPATIENT
Start: 2018-02-19 | End: 2018-03-02 | Stop reason: SDUPTHER

## 2018-02-19 NOTE — TELEPHONE ENCOUNTER
Per RW please destroy rx for norco this was approved by accident because it was not sent to Tanja milton to look into as it should have been. rx destroyed message sent to  to call him to make an appt. Pt has been told this before no more exceptions are being made for him RW is at violation of law.

## 2018-02-19 NOTE — TELEPHONE ENCOUNTER
Isaiah Hoffman can you please call this patient and make him an appointment. Dr. Jyoti Virgen will not refill any more of his Norco period he has been told this a couple of times and has no showed for his last appt for his CSM no more exceptions are being made for him RW has made exceptions and vilated the law for him and no longer is doing so. He has not been seen since 8/29/17!

## 2018-03-02 ENCOUNTER — OFFICE VISIT (OUTPATIENT)
Dept: FAMILY MEDICINE CLINIC | Age: 68
End: 2018-03-02
Payer: MEDICARE

## 2018-03-02 VITALS
RESPIRATION RATE: 14 BRPM | WEIGHT: 181 LBS | HEIGHT: 71 IN | BODY MASS INDEX: 25.34 KG/M2 | SYSTOLIC BLOOD PRESSURE: 120 MMHG | HEART RATE: 96 BPM | OXYGEN SATURATION: 96 % | TEMPERATURE: 98.5 F | DIASTOLIC BLOOD PRESSURE: 82 MMHG

## 2018-03-02 DIAGNOSIS — M54.50 CHRONIC BILATERAL LOW BACK PAIN WITHOUT SCIATICA: ICD-10-CM

## 2018-03-02 DIAGNOSIS — Z89.511 HX OF BKA, RIGHT (HCC): ICD-10-CM

## 2018-03-02 DIAGNOSIS — E53.8 B12 DEFICIENCY: ICD-10-CM

## 2018-03-02 DIAGNOSIS — G62.9 NEUROPATHY: ICD-10-CM

## 2018-03-02 DIAGNOSIS — S88.912A: Primary | ICD-10-CM

## 2018-03-02 DIAGNOSIS — G89.29 CHRONIC BILATERAL LOW BACK PAIN WITHOUT SCIATICA: ICD-10-CM

## 2018-03-02 PROCEDURE — G8419 CALC BMI OUT NRM PARAM NOF/U: HCPCS | Performed by: FAMILY MEDICINE

## 2018-03-02 PROCEDURE — G8598 ASA/ANTIPLAT THER USED: HCPCS | Performed by: FAMILY MEDICINE

## 2018-03-02 PROCEDURE — G8427 DOCREV CUR MEDS BY ELIG CLIN: HCPCS | Performed by: FAMILY MEDICINE

## 2018-03-02 PROCEDURE — 99213 OFFICE O/P EST LOW 20 MIN: CPT | Performed by: FAMILY MEDICINE

## 2018-03-02 PROCEDURE — 4004F PT TOBACCO SCREEN RCVD TLK: CPT | Performed by: FAMILY MEDICINE

## 2018-03-02 PROCEDURE — 4040F PNEUMOC VAC/ADMIN/RCVD: CPT | Performed by: FAMILY MEDICINE

## 2018-03-02 PROCEDURE — 96372 THER/PROPH/DIAG INJ SC/IM: CPT | Performed by: FAMILY MEDICINE

## 2018-03-02 PROCEDURE — G8484 FLU IMMUNIZE NO ADMIN: HCPCS | Performed by: FAMILY MEDICINE

## 2018-03-02 PROCEDURE — 1123F ACP DISCUSS/DSCN MKR DOCD: CPT | Performed by: FAMILY MEDICINE

## 2018-03-02 PROCEDURE — 3017F COLORECTAL CA SCREEN DOC REV: CPT | Performed by: FAMILY MEDICINE

## 2018-03-02 RX ORDER — HYDROCODONE BITARTRATE AND ACETAMINOPHEN 10; 325 MG/1; MG/1
1 TABLET ORAL EVERY 6 HOURS PRN
Qty: 120 TABLET | Refills: 0 | Status: SHIPPED | OUTPATIENT
Start: 2018-03-02 | End: 2018-03-28 | Stop reason: SDUPTHER

## 2018-03-02 RX ORDER — HYDROCODONE BITARTRATE AND ACETAMINOPHEN 10; 325 MG/1; MG/1
1 TABLET ORAL EVERY 6 HOURS PRN
Qty: 120 TABLET | Refills: 0 | Status: SHIPPED | OUTPATIENT
Start: 2018-03-02 | End: 2018-03-02 | Stop reason: SDUPTHER

## 2018-03-02 RX ORDER — CYANOCOBALAMIN 1000 UG/ML
1000 INJECTION INTRAMUSCULAR; SUBCUTANEOUS ONCE
Status: COMPLETED | OUTPATIENT
Start: 2018-03-02 | End: 2018-03-02

## 2018-03-02 RX ADMIN — CYANOCOBALAMIN 1000 MCG: 1000 INJECTION INTRAMUSCULAR; SUBCUTANEOUS at 11:15

## 2018-03-02 ASSESSMENT — ENCOUNTER SYMPTOMS
GASTROINTESTINAL NEGATIVE: 1
BACK PAIN: 1
RESPIRATORY NEGATIVE: 1
ALLERGIC/IMMUNOLOGIC NEGATIVE: 1
SHORTNESS OF BREATH: 0
EYES NEGATIVE: 1

## 2018-03-02 NOTE — PROGRESS NOTES
Patient is seen in follow up for   Chief Complaint   Patient presents with    Medication Check     [facility]. He also has ddd lumbar spine and has maintained ADL    Past Medical History:   Diagnosis Date    Arthritis     sever- of the elbow and hips    Atherosclerotic heart disease     BPH (benign prostatic hyperplasia)     CAD (coronary artery disease)     Chronic anxiety     Chronic lower back pain     Depression     Elevated cholesterol     Erectile dysfunction     HTN (hypertension)     HTN (hypertension)     Hypercholesteremia     Lipoma     of the arms    Lumbar pain     Neuropathy (HCC)     Nicotine abuse     Osteoarthritis     of multiple joints     Patient Active Problem List    Diagnosis Date Noted    Controlled substance agreement signed 06/29/2017    Internal hemorrhoids with complication 44/96/6880    B12 deficiency 04/01/2013    Traumatic amputation of left leg ([facility] [address] 10/02/2012    Other B-complex deficiencies 05/03/2012    Neuropathy (HCC)     Lipoma     Chronic anxiety     Lumbar pain     Atherosclerotic heart disease     Chronic lower back pain     Osteoarthritis     Arthritis     BPH (benign prostatic hyperplasia)     HTN (hypertension)     Erectile dysfunction     Hypercholesteremia     Nicotine abuse     CAD (coronary artery disease)      Past Surgical History:   Procedure Laterality Date    ANGIOPLASTY      ABOUT 15 YRS AGO    CARDIAC SURGERY      3 stents 2013?     [facility]2016    [facility]    [facility]AL [facility]/08/2016    [facility]    COLONOSCOPY  7/30/14,7/09,6/04    polyps [email or web address]    LEG SURGERY  aug 16th    above left knee     [facility]RECTOMY Right 08/15/2016    [facility]     Family History   Problem Relation Age of Onset    Cancer Mother     Diabetes Sister     Heart Disease Brother      Social History     Social History    Marital status: Single     Spouse name: N/A   Madhuri Salvador Number of children: N/A    Years of education: N/A     Social History Main Topics    Smoking status: Current Every Day Smoker     Packs/day: 0.80     Years: 30.00     Types: Cigarettes    Smokeless tobacco: Never Used    Alcohol use No      Comment: denies, no alcoholx 6 years    Drug use: Unknown    Sexual activity: Not Asked     Other Topics Concern    None     Social History Narrative    None     Current Outpatient Prescriptions   Medication Sig Dispense Refill    HYDROcodone-acetaminophen (NORCO)  MG per tablet Take 1 tablet by mouth every 6 hours as needed for Pain for up to 30 days. 120 tablet 0    atorvastatin (LIPITOR) 80 MG tablet Take 1 tablet by mouth nightly 90 tablet 3    amLODIPine (NORVASC) 5 MG tablet Take 1 tablet by mouth daily 90 tablet 1    carvedilol (COREG) 3.125 MG tablet Take 1 tablet by mouth 2 times daily 60 tablet 5    vardenafil (LEVITRA) 10 MG tablet Take 1 tablet by mouth as needed for Erectile Dysfunction 6 tablet 3    Handicap Placard MISC by Does not apply route Duration of 5 years. Dx.Lumbar pain,osteoarthritis 1 each 0    donepezil (ARICEPT) 10 MG tablet 10 mg nightly  5    tiZANidine (ZANAFLEX) 4 MG tablet Take 1 tablet by mouth 3 times daily 60 tablet 3    losartan (COZAAR) 25 MG tablet Take 1 tablet by mouth daily 30 tablet 5    orphenadrine (NORFLEX) 100 MG extended release tablet Take 1 tablet by mouth 2 times daily for 10 days 20 tablet 0    nitroGLYCERIN (NITROSTAT) 0.4 MG SL tablet Place 1 tablet under the tongue every 5 minutes as needed for Chest pain 25 tablet 1    ALPRAZolam (XANAX) 0.5 MG tablet Take 1 tablet by mouth nightly as needed for Sleep 30 tablet 1    Bee Pollen-Propolis-RoyalJelly TABS Take  by mouth.  aspirin 81 MG tablet Take 81 mg by mouth daily.        Current Facility-Administered Medications   Medication Dose Route Frequency Provider Last Rate Last Dose    cyanocobalamin injection 1,000 mcg  1,000 mcg Intramuscular Once Xavier Nissen, MD         Current Outpatient Prescriptions on File Prior to Visit   Medication Sig Dispense Refill    atorvastatin (LIPITOR) 80 MG tablet Take 1 tablet by mouth nightly 90 tablet 3    amLODIPine (NORVASC) 5 MG tablet Take 1 tablet by mouth daily 90 tablet 1    carvedilol (COREG) 3.125 MG tablet Take 1 tablet by mouth 2 times daily 60 tablet 5    vardenafil (LEVITRA) 10 MG tablet Take 1 tablet by mouth as needed for Erectile Dysfunction 6 tablet 3    Handicap Placard MISC by Does not apply route Duration of 5 years. Dx.Lumbar pain,osteoarthritis 1 each 0    donepezil (ARICEPT) 10 MG tablet 10 mg nightly  5    tiZANidine (ZANAFLEX) 4 MG tablet Take 1 tablet by mouth 3 times daily 60 tablet 3    losartan (COZAAR) 25 MG tablet Take 1 tablet by mouth daily 30 tablet 5    orphenadrine (NORFLEX) 100 MG extended release tablet Take 1 tablet by mouth 2 times daily for 10 days 20 tablet 0    nitroGLYCERIN (NITROSTAT) 0.4 MG SL tablet Place 1 tablet under the tongue every 5 minutes as needed for Chest pain 25 tablet 1    ALPRAZolam (XANAX) 0.5 MG tablet Take 1 tablet by mouth nightly as needed for Sleep 30 tablet 1    Bee Pollen-Propolis-RoyalJelly TABS Take  by mouth.  aspirin 81 MG tablet Take 81 mg by mouth daily.        Current Facility-Administered Medications on File Prior to Visit   Medication Dose Route Frequency Provider Last Rate Last Dose    cyanocobalamin injection 1,000 mcg  1,000 mcg Intramuscular Once Xavier Nissen, MD         No Known Allergies  Health Maintenance   Topic Date Due    AAA screen  1950    Flu vaccine (1) 09/01/2018 (Originally 9/1/2017)    Pneumococcal low/med risk (1 of 2 - PCV13) 03/02/2019 (Originally 7/15/2016)    Shingles Vaccine (1 of 2 - 2 Dose Series) 03/02/2019 (Originally 7/15/2001)    Colon cancer screen colonoscopy  07/30/2019    Lipid screen  10/10/2021    DTaP/Tdap/Td vaccine (2 - Td) 05/23/2022    Hepatitis C screen  Addressed HYDROcodone-acetaminophen (NORCO)  MG per tablet   4.  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    Relevant Medications    HYDROcodone-acetaminophen (NORCO)  MG per tablet    B12 deficiency    Relevant Medications    cyanocobalamin injection 1,000 mcg (Completed)    cyanocobalamin injection 1,000 mcg (Completed)    cyanocobalamin injection 1,000 mcg (Completed)    cyanocobalamin injection 1,000 mcg (Completed)    cyanocobalamin injection 1,000 mcg (Completed)    cyanocobalamin injection 1,000 mcg (Completed)    cyanocobalamin injection 1,000 mcg (Completed)    cyanocobalamin injection 1,000 mcg (Completed)    cyanocobalamin injection 1,000 mcg (Completed)    cyanocobalamin injection 1,000 mcg (Completed)    cyanocobalamin injection 1,000 mcg (Completed)    cyanocobalamin injection 1,000 mcg (Completed)    cyanocobalamin injection 1,000 mcg (Completed)    cyanocobalamin injection 1,000 mcg (Completed)    cyanocobalamin injection 1,000 mcg (Completed)    cyanocobalamin injection 1,000 mcg (Completed)    cyanocobalamin injection 1,000 mcg (Completed)    cyanocobalamin injection 1,000 mcg (Completed)    cyanocobalamin injection 1,000 mcg (Completed)    cyanocobalamin injection 1,000 mcg (Completed)    cyanocobalamin injection 1,000 mcg (Completed)    cyanocobalamin injection 1,000 mcg (Completed)    cyanocobalamin injection 1,000 mcg (Completed)    cyanocobalamin injection 1,000 mcg (Completed)    cyanocobalamin injection 1,000 mcg (Completed)    cyanocobalamin injection 1,000 mcg (Completed) cyanocobalamin injection 1,000 mcg (Completed)    cyanocobalamin injection 1,000 mcg (Completed)    cyanocobalamin injection 1,000 mcg (Completed)    cyanocobalamin injection 1,000 mcg (Completed)    cyanocobalamin injection 1,000 mcg (Completed)    cyanocobalamin injection 1,000 mcg (Completed)    cyanocobalamin injection 1,000 mcg (Completed)    cyanocobalamin injection 1,000 mcg (Completed)    cyanocobalamin injection 1,000 mcg (Completed)    cyanocobalamin injection 1,000 mcg (Completed)    cyanocobalamin injection 1,000 mcg (Completed)    cyanocobalamin injection 1,000 mcg (Completed)    cyanocobalamin injection 1,000 mcg (Completed)    cyanocobalamin injection 1,000 mcg (Completed)    cyanocobalamin injection 1,000 mcg (Completed)    cyanocobalamin injection 1,000 mcg (Completed)          Plan  Doing well follow up in three month  Orders Placed This Encounter   Medications    DISCONTD: HYDROcodone-acetaminophen (NORCO)  MG per tablet     Sig: Take 1 tablet by mouth every 6 hours as needed for Pain for up to 30 days. Earliest Fill Date: 3/2/18     Dispense:  120 tablet     Refill:  0    cyanocobalamin injection 1,000 mcg    HYDROcodone-acetaminophen (NORCO)  MG per tablet     Sig: Take 1 tablet by mouth every 6 hours as needed for Pain for up to 30 days. Dispense:  120 tablet     Refill:  0     Return in about 3 months (around 6/2/2018).   Veronica Sherman MD

## 2018-03-15 ENCOUNTER — TELEPHONE (OUTPATIENT)
Dept: INTERNAL MEDICINE CLINIC | Age: 68
End: 2018-03-15

## 2018-03-28 DIAGNOSIS — S88.912A: ICD-10-CM

## 2018-03-28 DIAGNOSIS — G62.9 NEUROPATHY: ICD-10-CM

## 2018-03-28 DIAGNOSIS — M54.50 CHRONIC BILATERAL LOW BACK PAIN WITHOUT SCIATICA: ICD-10-CM

## 2018-03-28 DIAGNOSIS — G89.29 CHRONIC BILATERAL LOW BACK PAIN WITHOUT SCIATICA: ICD-10-CM

## 2018-03-28 RX ORDER — HYDROCODONE BITARTRATE AND ACETAMINOPHEN 10; 325 MG/1; MG/1
1 TABLET ORAL EVERY 6 HOURS PRN
Qty: 120 TABLET | Refills: 0 | Status: SHIPPED | OUTPATIENT
Start: 2018-03-28 | End: 2018-04-30 | Stop reason: SDUPTHER

## 2018-04-30 ENCOUNTER — TELEPHONE (OUTPATIENT)
Dept: INTERNAL MEDICINE CLINIC | Age: 68
End: 2018-04-30

## 2018-04-30 DIAGNOSIS — S88.912A: ICD-10-CM

## 2018-04-30 DIAGNOSIS — G62.9 NEUROPATHY: ICD-10-CM

## 2018-04-30 DIAGNOSIS — G89.29 CHRONIC BILATERAL LOW BACK PAIN WITHOUT SCIATICA: ICD-10-CM

## 2018-04-30 DIAGNOSIS — M54.50 CHRONIC BILATERAL LOW BACK PAIN WITHOUT SCIATICA: ICD-10-CM

## 2018-04-30 RX ORDER — HYDROCODONE BITARTRATE AND ACETAMINOPHEN 10; 325 MG/1; MG/1
1 TABLET ORAL EVERY 6 HOURS PRN
Qty: 120 TABLET | Refills: 0 | Status: SHIPPED | OUTPATIENT
Start: 2018-04-30 | End: 2018-05-01 | Stop reason: SDUPTHER

## 2018-05-01 ENCOUNTER — OFFICE VISIT (OUTPATIENT)
Dept: INTERNAL MEDICINE CLINIC | Age: 68
End: 2018-05-01
Payer: MEDICARE

## 2018-05-01 VITALS
TEMPERATURE: 98 F | SYSTOLIC BLOOD PRESSURE: 118 MMHG | WEIGHT: 189.6 LBS | OXYGEN SATURATION: 98 % | BODY MASS INDEX: 24.33 KG/M2 | HEART RATE: 59 BPM | DIASTOLIC BLOOD PRESSURE: 70 MMHG | RESPIRATION RATE: 16 BRPM | HEIGHT: 74 IN

## 2018-05-01 DIAGNOSIS — M54.50 CHRONIC BILATERAL LOW BACK PAIN WITHOUT SCIATICA: Primary | ICD-10-CM

## 2018-05-01 DIAGNOSIS — G89.29 CHRONIC BILATERAL LOW BACK PAIN WITHOUT SCIATICA: Primary | ICD-10-CM

## 2018-05-01 DIAGNOSIS — S88.912A: ICD-10-CM

## 2018-05-01 DIAGNOSIS — G62.9 NEUROPATHY: ICD-10-CM

## 2018-05-01 DIAGNOSIS — E53.8 B12 DEFICIENCY: ICD-10-CM

## 2018-05-01 PROCEDURE — 99213 OFFICE O/P EST LOW 20 MIN: CPT | Performed by: FAMILY MEDICINE

## 2018-05-01 PROCEDURE — 96372 THER/PROPH/DIAG INJ SC/IM: CPT | Performed by: FAMILY MEDICINE

## 2018-05-01 PROCEDURE — 1036F TOBACCO NON-USER: CPT | Performed by: FAMILY MEDICINE

## 2018-05-01 PROCEDURE — 3017F COLORECTAL CA SCREEN DOC REV: CPT | Performed by: FAMILY MEDICINE

## 2018-05-01 PROCEDURE — G8427 DOCREV CUR MEDS BY ELIG CLIN: HCPCS | Performed by: FAMILY MEDICINE

## 2018-05-01 PROCEDURE — 4040F PNEUMOC VAC/ADMIN/RCVD: CPT | Performed by: FAMILY MEDICINE

## 2018-05-01 PROCEDURE — 1123F ACP DISCUSS/DSCN MKR DOCD: CPT | Performed by: FAMILY MEDICINE

## 2018-05-01 PROCEDURE — G8420 CALC BMI NORM PARAMETERS: HCPCS | Performed by: FAMILY MEDICINE

## 2018-05-01 PROCEDURE — G8598 ASA/ANTIPLAT THER USED: HCPCS | Performed by: FAMILY MEDICINE

## 2018-05-01 RX ORDER — HYDROCODONE BITARTRATE AND ACETAMINOPHEN 10; 325 MG/1; MG/1
1 TABLET ORAL EVERY 6 HOURS PRN
Qty: 120 TABLET | Refills: 0 | Status: SHIPPED | OUTPATIENT
Start: 2018-05-01 | End: 2018-05-25 | Stop reason: SDUPTHER

## 2018-05-01 RX ORDER — CYANOCOBALAMIN 1000 UG/ML
1000 INJECTION INTRAMUSCULAR; SUBCUTANEOUS ONCE
Status: COMPLETED | OUTPATIENT
Start: 2018-05-01 | End: 2018-05-01

## 2018-05-01 RX ADMIN — CYANOCOBALAMIN 1000 MCG: 1000 INJECTION INTRAMUSCULAR; SUBCUTANEOUS at 09:30

## 2018-05-01 ASSESSMENT — ENCOUNTER SYMPTOMS
EYES NEGATIVE: 1
RESPIRATORY NEGATIVE: 1
GASTROINTESTINAL NEGATIVE: 1
SHORTNESS OF BREATH: 0
ALLERGIC/IMMUNOLOGIC NEGATIVE: 1

## 2018-05-25 ENCOUNTER — OFFICE VISIT (OUTPATIENT)
Dept: INTERNAL MEDICINE CLINIC | Age: 68
End: 2018-05-25
Payer: MEDICARE

## 2018-05-25 VITALS
HEART RATE: 59 BPM | OXYGEN SATURATION: 93 % | SYSTOLIC BLOOD PRESSURE: 110 MMHG | TEMPERATURE: 98.3 F | HEIGHT: 74 IN | BODY MASS INDEX: 24.54 KG/M2 | WEIGHT: 191.2 LBS | DIASTOLIC BLOOD PRESSURE: 62 MMHG

## 2018-05-25 DIAGNOSIS — E53.8 B12 DEFICIENCY: ICD-10-CM

## 2018-05-25 DIAGNOSIS — S88.912A: ICD-10-CM

## 2018-05-25 DIAGNOSIS — Z13.6 SCREENING FOR AAA (ABDOMINAL AORTIC ANEURYSM): ICD-10-CM

## 2018-05-25 DIAGNOSIS — Z79.899 HIGH RISK MEDICATION USE: ICD-10-CM

## 2018-05-25 DIAGNOSIS — M54.50 CHRONIC BILATERAL LOW BACK PAIN WITHOUT SCIATICA: ICD-10-CM

## 2018-05-25 DIAGNOSIS — Z23 NEED FOR VACCINATION FOR PNEUMOCOCCUS: ICD-10-CM

## 2018-05-25 DIAGNOSIS — G62.9 NEUROPATHY: ICD-10-CM

## 2018-05-25 DIAGNOSIS — G89.29 CHRONIC BILATERAL LOW BACK PAIN WITHOUT SCIATICA: ICD-10-CM

## 2018-05-25 PROCEDURE — 90670 PCV13 VACCINE IM: CPT | Performed by: NURSE PRACTITIONER

## 2018-05-25 PROCEDURE — 1123F ACP DISCUSS/DSCN MKR DOCD: CPT | Performed by: NURSE PRACTITIONER

## 2018-05-25 PROCEDURE — 1036F TOBACCO NON-USER: CPT | Performed by: NURSE PRACTITIONER

## 2018-05-25 PROCEDURE — 96372 THER/PROPH/DIAG INJ SC/IM: CPT | Performed by: NURSE PRACTITIONER

## 2018-05-25 PROCEDURE — G0009 ADMIN PNEUMOCOCCAL VACCINE: HCPCS | Performed by: NURSE PRACTITIONER

## 2018-05-25 PROCEDURE — G8427 DOCREV CUR MEDS BY ELIG CLIN: HCPCS | Performed by: NURSE PRACTITIONER

## 2018-05-25 PROCEDURE — 4040F PNEUMOC VAC/ADMIN/RCVD: CPT | Performed by: NURSE PRACTITIONER

## 2018-05-25 PROCEDURE — G8420 CALC BMI NORM PARAMETERS: HCPCS | Performed by: NURSE PRACTITIONER

## 2018-05-25 PROCEDURE — 3017F COLORECTAL CA SCREEN DOC REV: CPT | Performed by: NURSE PRACTITIONER

## 2018-05-25 PROCEDURE — 99213 OFFICE O/P EST LOW 20 MIN: CPT | Performed by: NURSE PRACTITIONER

## 2018-05-25 PROCEDURE — G8598 ASA/ANTIPLAT THER USED: HCPCS | Performed by: NURSE PRACTITIONER

## 2018-05-25 RX ORDER — HYDROCODONE BITARTRATE AND ACETAMINOPHEN 10; 325 MG/1; MG/1
1 TABLET ORAL EVERY 6 HOURS PRN
Qty: 120 TABLET | Refills: 0 | Status: ON HOLD | OUTPATIENT
Start: 2018-05-30 | End: 2020-08-08

## 2018-05-25 RX ORDER — CYANOCOBALAMIN 1000 UG/ML
1000 INJECTION INTRAMUSCULAR; SUBCUTANEOUS ONCE
Status: COMPLETED | OUTPATIENT
Start: 2018-05-25 | End: 2018-05-25

## 2018-05-25 RX ADMIN — CYANOCOBALAMIN 1000 MCG: 1000 INJECTION INTRAMUSCULAR; SUBCUTANEOUS at 10:51

## 2018-05-25 ASSESSMENT — ENCOUNTER SYMPTOMS
BACK PAIN: 1
VOMITING: 0
NAUSEA: 0
ABDOMINAL PAIN: 0
CONSTIPATION: 0
WHEEZING: 0
DIARRHEA: 0
SHORTNESS OF BREATH: 0
COUGH: 0

## 2018-05-29 LAB
6-ACETYLMORPHINE: NOT DETECTED
7-AMINOCLONAZEPAM: NOT DETECTED
ALPHA-OH-ALPRAZOLAM: NOT DETECTED
ALPRAZOLAM: NOT DETECTED
AMPHETAMINE: NOT DETECTED
BARBITURATES: NOT DETECTED
BENZOYLECGONINE: NOT DETECTED
BUPRENORPHINE: NOT DETECTED
CARISOPRODOL: NOT DETECTED
CLONAZEPAM: NOT DETECTED
CODEINE: NOT DETECTED
CREATININE URINE: 205.6 MG/DL (ref 20–400)
DIAZEPAM: NOT DETECTED
EER PAIN MGT DRUG PANEL, HIGH RES/EMIT U: NORMAL
ETHYL GLUCURONIDE: NOT DETECTED
FENTANYL: NOT DETECTED
HYDROCODONE: NOT DETECTED
HYDROMORPHONE: NOT DETECTED
LORAZEPAM: NOT DETECTED
MARIJUANA METABOLITE: NOT DETECTED
MDA: NOT DETECTED
MDEA: NOT DETECTED
MDMA URINE: NOT DETECTED
MEPERIDINE: NOT DETECTED
METHADONE: NOT DETECTED
METHAMPHETAMINE: NOT DETECTED
METHYLPHENIDATE: NOT DETECTED
MIDAZOLAM: NOT DETECTED
MORPHINE: NOT DETECTED
NORBUPRENORPHINE, FREE: NOT DETECTED
NORDIAZEPAM: NOT DETECTED
NORFENTANYL: NOT DETECTED
NORHYDROCODONE, URINE: NOT DETECTED
NOROXYCODONE: NOT DETECTED
NOROXYMORPHONE, URINE: NOT DETECTED
OXAZEPAM: NOT DETECTED
OXYCODONE: NOT DETECTED
OXYMORPHONE: NOT DETECTED
PAIN MANAGEMENT DRUG PANEL: NORMAL
PCP: NOT DETECTED
PHENTERMINE: NOT DETECTED
PROPOXYPHENE: NOT DETECTED
TAPENTADOL, URINE: NOT DETECTED
TAPENTADOL-O-SULFATE, URINE: NOT DETECTED
TEMAZEPAM: NOT DETECTED
TRAMADOL: NOT DETECTED
ZOLPIDEM: NOT DETECTED

## 2018-06-04 ENCOUNTER — TELEPHONE (OUTPATIENT)
Dept: INTERNAL MEDICINE CLINIC | Age: 68
End: 2018-06-04

## 2018-06-20 RX ORDER — AMLODIPINE BESYLATE 5 MG/1
TABLET ORAL
Qty: 90 TABLET | Refills: 3 | Status: SHIPPED | OUTPATIENT
Start: 2018-06-20 | End: 2019-10-24

## 2018-07-09 RX ORDER — CARVEDILOL 3.12 MG/1
3.12 TABLET ORAL 2 TIMES DAILY
Qty: 60 TABLET | Refills: 5 | Status: SHIPPED | OUTPATIENT
Start: 2018-07-09 | End: 2019-01-28 | Stop reason: SDUPTHER

## 2018-10-01 ENCOUNTER — TELEPHONE (OUTPATIENT)
Dept: FAMILY MEDICINE CLINIC | Age: 68
End: 2018-10-01

## 2019-01-29 RX ORDER — CARVEDILOL 3.12 MG/1
3.12 TABLET ORAL 2 TIMES DAILY
Qty: 60 TABLET | Refills: 0 | Status: SHIPPED | OUTPATIENT
Start: 2019-01-29 | End: 2019-03-03 | Stop reason: SDUPTHER

## 2019-02-08 ENCOUNTER — TELEPHONE (OUTPATIENT)
Dept: INTERNAL MEDICINE CLINIC | Age: 69
End: 2019-02-08

## 2019-02-19 RX ORDER — OXYCODONE HYDROCHLORIDE 5 MG/1
5 TABLET ORAL EVERY 4 HOURS PRN
COMMUNITY
End: 2019-10-24

## 2019-02-20 ENCOUNTER — OFFICE VISIT (OUTPATIENT)
Dept: GERIATRIC MEDICINE | Age: 69
End: 2019-02-20
Payer: MEDICARE

## 2019-02-20 DIAGNOSIS — Z98.890 S/P BONE GRAFT: Primary | ICD-10-CM

## 2019-02-20 DIAGNOSIS — M79.604 RIGHT LEG PAIN: ICD-10-CM

## 2019-02-20 PROCEDURE — 99309 SBSQ NF CARE MODERATE MDM 30: CPT | Performed by: NURSE PRACTITIONER

## 2019-02-20 PROCEDURE — G8484 FLU IMMUNIZE NO ADMIN: HCPCS | Performed by: NURSE PRACTITIONER

## 2019-02-20 PROCEDURE — 1101F PT FALLS ASSESS-DOCD LE1/YR: CPT | Performed by: NURSE PRACTITIONER

## 2019-02-20 PROCEDURE — 1123F ACP DISCUSS/DSCN MKR DOCD: CPT | Performed by: NURSE PRACTITIONER

## 2019-02-20 PROCEDURE — 3017F COLORECTAL CA SCREEN DOC REV: CPT | Performed by: NURSE PRACTITIONER

## 2019-02-21 ENCOUNTER — OFFICE VISIT (OUTPATIENT)
Dept: GERIATRIC MEDICINE | Age: 69
End: 2019-02-21
Payer: MEDICARE

## 2019-02-21 DIAGNOSIS — R53.1 WEAKNESS: ICD-10-CM

## 2019-02-21 DIAGNOSIS — M79.604 RIGHT LEG PAIN: Primary | ICD-10-CM

## 2019-02-21 DIAGNOSIS — G93.40 ENCEPHALOPATHY: ICD-10-CM

## 2019-02-21 DIAGNOSIS — I10 ESSENTIAL HYPERTENSION: ICD-10-CM

## 2019-02-21 PROCEDURE — 99305 1ST NF CARE MODERATE MDM 35: CPT | Performed by: INTERNAL MEDICINE

## 2019-02-21 PROCEDURE — G8484 FLU IMMUNIZE NO ADMIN: HCPCS | Performed by: INTERNAL MEDICINE

## 2019-02-21 PROCEDURE — 3017F COLORECTAL CA SCREEN DOC REV: CPT | Performed by: INTERNAL MEDICINE

## 2019-02-21 PROCEDURE — 1123F ACP DISCUSS/DSCN MKR DOCD: CPT | Performed by: INTERNAL MEDICINE

## 2019-02-21 PROCEDURE — 1101F PT FALLS ASSESS-DOCD LE1/YR: CPT | Performed by: INTERNAL MEDICINE

## 2019-02-21 RX ORDER — ATORVASTATIN CALCIUM 80 MG/1
80 TABLET, FILM COATED ORAL NIGHTLY
Qty: 90 TABLET | Refills: 0 | Status: ON HOLD | OUTPATIENT
Start: 2019-02-21 | End: 2020-08-08

## 2019-03-01 VITALS
DIASTOLIC BLOOD PRESSURE: 76 MMHG | BODY MASS INDEX: 23.34 KG/M2 | RESPIRATION RATE: 18 BRPM | OXYGEN SATURATION: 97 % | SYSTOLIC BLOOD PRESSURE: 121 MMHG | HEART RATE: 74 BPM | TEMPERATURE: 98.1 F | WEIGHT: 181.8 LBS

## 2019-03-01 VITALS — DIASTOLIC BLOOD PRESSURE: 70 MMHG | HEART RATE: 88 BPM | SYSTOLIC BLOOD PRESSURE: 134 MMHG | TEMPERATURE: 97.2 F

## 2019-03-03 PROBLEM — M79.604 RIGHT LEG PAIN: Status: ACTIVE | Noted: 2019-03-03

## 2019-03-05 RX ORDER — CARVEDILOL 3.12 MG/1
3.12 TABLET ORAL 2 TIMES DAILY
Qty: 60 TABLET | Refills: 0 | Status: SHIPPED | OUTPATIENT
Start: 2019-03-05 | End: 2019-10-24

## 2019-07-03 ENCOUNTER — TELEPHONE (OUTPATIENT)
Dept: GASTROENTEROLOGY | Age: 69
End: 2019-07-03

## 2019-10-14 DIAGNOSIS — G30.1 LATE ONSET ALZHEIMER'S DISEASE WITH BEHAVIORAL DISTURBANCE (HCC): ICD-10-CM

## 2019-10-14 DIAGNOSIS — F41.9 CHRONIC ANXIETY: ICD-10-CM

## 2019-10-14 DIAGNOSIS — S88.912D TRAUMATIC AMPUTATION OF LEFT LOWER EXTREMITY, SUBSEQUENT ENCOUNTER (HCC): Primary | ICD-10-CM

## 2019-10-14 DIAGNOSIS — F02.818 LATE ONSET ALZHEIMER'S DISEASE WITH BEHAVIORAL DISTURBANCE (HCC): ICD-10-CM

## 2019-10-23 ENCOUNTER — OFFICE VISIT (OUTPATIENT)
Dept: GERIATRIC MEDICINE | Age: 69
End: 2019-10-23
Payer: MEDICARE

## 2019-10-23 DIAGNOSIS — R60.0 LEG EDEMA: ICD-10-CM

## 2019-10-23 DIAGNOSIS — G93.40 ENCEPHALOPATHY: ICD-10-CM

## 2019-10-23 DIAGNOSIS — R56.9 SEIZURE (HCC): ICD-10-CM

## 2019-10-23 DIAGNOSIS — R53.1 WEAKNESS: ICD-10-CM

## 2019-10-23 DIAGNOSIS — F03.90 DEMENTIA WITHOUT BEHAVIORAL DISTURBANCE, UNSPECIFIED DEMENTIA TYPE: Primary | ICD-10-CM

## 2019-10-23 PROCEDURE — 99305 1ST NF CARE MODERATE MDM 35: CPT | Performed by: INTERNAL MEDICINE

## 2019-10-23 PROCEDURE — 1123F ACP DISCUSS/DSCN MKR DOCD: CPT | Performed by: INTERNAL MEDICINE

## 2019-10-23 PROCEDURE — G8484 FLU IMMUNIZE NO ADMIN: HCPCS | Performed by: INTERNAL MEDICINE

## 2019-10-24 RX ORDER — LANOLIN ALCOHOL/MO/W.PET/CERES
3 CREAM (GRAM) TOPICAL NIGHTLY PRN
Status: ON HOLD | COMMUNITY
End: 2020-08-08

## 2019-10-24 RX ORDER — VALPROIC ACID 250 MG/1
500 CAPSULE, LIQUID FILLED ORAL 4 TIMES DAILY
COMMUNITY

## 2019-10-24 RX ORDER — ALBUTEROL SULFATE 0.63 MG/3ML
1 SOLUTION RESPIRATORY (INHALATION) EVERY 6 HOURS PRN
COMMUNITY

## 2019-10-24 RX ORDER — THIAMINE MONONITRATE (VIT B1) 100 MG
100 TABLET ORAL DAILY
Status: ON HOLD | COMMUNITY
End: 2020-08-08

## 2019-10-24 RX ORDER — PRASUGREL 10 MG/1
10 TABLET, FILM COATED ORAL DAILY
Status: ON HOLD | COMMUNITY
End: 2020-08-08

## 2019-10-24 RX ORDER — FOLIC ACID 1 MG/1
1 TABLET ORAL DAILY
Status: ON HOLD | COMMUNITY
End: 2020-08-08

## 2019-10-24 RX ORDER — ARIPIPRAZOLE 30 MG/1
30 TABLET ORAL DAILY
COMMUNITY

## 2019-10-25 ENCOUNTER — OFFICE VISIT (OUTPATIENT)
Dept: GERIATRIC MEDICINE | Age: 69
End: 2019-10-25
Payer: MEDICARE

## 2019-10-25 DIAGNOSIS — R60.0 LEG EDEMA, RIGHT: Primary | ICD-10-CM

## 2019-10-25 DIAGNOSIS — I25.10 CORONARY ARTERY DISEASE INVOLVING NATIVE CORONARY ARTERY OF NATIVE HEART WITHOUT ANGINA PECTORIS: ICD-10-CM

## 2019-10-25 DIAGNOSIS — R29.6 FREQUENT FALLS: ICD-10-CM

## 2019-10-25 DIAGNOSIS — K59.01 SLOW TRANSIT CONSTIPATION: ICD-10-CM

## 2019-10-25 PROCEDURE — 1123F ACP DISCUSS/DSCN MKR DOCD: CPT | Performed by: NURSE PRACTITIONER

## 2019-10-25 PROCEDURE — G8484 FLU IMMUNIZE NO ADMIN: HCPCS | Performed by: NURSE PRACTITIONER

## 2019-10-25 PROCEDURE — 99309 SBSQ NF CARE MODERATE MDM 30: CPT | Performed by: NURSE PRACTITIONER

## 2019-10-28 LAB
BASOPHILS ABSOLUTE: NORMAL /ΜL
BASOPHILS RELATIVE PERCENT: 0.6 %
BUN BLDV-MCNC: 24 MG/DL
CALCIUM SERPL-MCNC: 9 MG/DL
CHLORIDE BLD-SCNC: 108 MMOL/L
CO2: 25 MMOL/L
CREAT SERPL-MCNC: 1.1 MG/DL
EOSINOPHILS ABSOLUTE: 0.2 /ΜL
EOSINOPHILS RELATIVE PERCENT: 2.3 %
GFR CALCULATED: NORMAL
GLUCOSE BLD-MCNC: 97 MG/DL
HCT VFR BLD CALC: 42 % (ref 41–53)
HEMOGLOBIN: 13.7 G/DL (ref 13.5–17.5)
LYMPHOCYTES ABSOLUTE: 2.3 /ΜL
LYMPHOCYTES RELATIVE PERCENT: 34.5 %
MCH RBC QN AUTO: 30.8 PG
MCHC RBC AUTO-ENTMCNC: 32.7 G/DL
MCV RBC AUTO: 94.4 FL
MONOCYTES ABSOLUTE: 1 /ΜL
MONOCYTES RELATIVE PERCENT: 14.8 %
NEUTROPHILS ABSOLUTE: 3.2 /ΜL
NEUTROPHILS RELATIVE PERCENT: 47.8 %
PLATELET # BLD: 223 K/ΜL
PMV BLD AUTO: 223 FL
POTASSIUM SERPL-SCNC: 4.1 MMOL/L
RBC # BLD: 4.46 10^6/ΜL
SODIUM BLD-SCNC: 144 MMOL/L
WBC # BLD: 6.7 10^3/ML

## 2019-10-30 ENCOUNTER — OFFICE VISIT (OUTPATIENT)
Dept: GERIATRIC MEDICINE | Age: 69
End: 2019-10-30
Payer: MEDICARE

## 2019-10-30 DIAGNOSIS — R06.2 WHEEZE: ICD-10-CM

## 2019-10-30 DIAGNOSIS — E87.70 HYPERVOLEMIA, UNSPECIFIED HYPERVOLEMIA TYPE: Primary | ICD-10-CM

## 2019-10-30 DIAGNOSIS — I87.8 VENOUS STASIS: ICD-10-CM

## 2019-10-30 PROCEDURE — G8484 FLU IMMUNIZE NO ADMIN: HCPCS | Performed by: NURSE PRACTITIONER

## 2019-10-30 PROCEDURE — 1123F ACP DISCUSS/DSCN MKR DOCD: CPT | Performed by: NURSE PRACTITIONER

## 2019-10-30 PROCEDURE — 99309 SBSQ NF CARE MODERATE MDM 30: CPT | Performed by: NURSE PRACTITIONER

## 2019-10-31 ENCOUNTER — CLINICAL DOCUMENTATION (OUTPATIENT)
Dept: PALLATIVE CARE | Age: 69
End: 2019-10-31

## 2019-11-01 LAB
BUN BLDV-MCNC: 19 MG/DL
CALCIUM SERPL-MCNC: 8.6 MG/DL
CHLORIDE BLD-SCNC: 108 MMOL/L
CO2: 27 MMOL/L
CREAT SERPL-MCNC: 0.8 MG/DL
GFR CALCULATED: 96
GLUCOSE BLD-MCNC: 85 MG/DL
POTASSIUM SERPL-SCNC: 4.4 MMOL/L
SODIUM BLD-SCNC: 144 MMOL/L

## 2019-11-13 ENCOUNTER — OFFICE VISIT (OUTPATIENT)
Dept: GERIATRIC MEDICINE | Age: 69
End: 2019-11-13
Payer: MEDICARE

## 2019-11-13 DIAGNOSIS — R35.0 URINARY FREQUENCY: ICD-10-CM

## 2019-11-13 DIAGNOSIS — G93.40 ENCEPHALOPATHY: Primary | ICD-10-CM

## 2019-11-13 DIAGNOSIS — F03.90 DEMENTIA WITHOUT BEHAVIORAL DISTURBANCE, UNSPECIFIED DEMENTIA TYPE: ICD-10-CM

## 2019-11-13 DIAGNOSIS — R56.9 SEIZURE (HCC): ICD-10-CM

## 2019-11-13 DIAGNOSIS — I10 ESSENTIAL HYPERTENSION: ICD-10-CM

## 2019-11-13 PROCEDURE — G8484 FLU IMMUNIZE NO ADMIN: HCPCS | Performed by: NURSE PRACTITIONER

## 2019-11-13 PROCEDURE — 99309 SBSQ NF CARE MODERATE MDM 30: CPT | Performed by: NURSE PRACTITIONER

## 2019-11-13 PROCEDURE — 1123F ACP DISCUSS/DSCN MKR DOCD: CPT | Performed by: NURSE PRACTITIONER

## 2019-11-18 LAB
BUN BLDV-MCNC: 25 MG/DL
CALCIUM SERPL-MCNC: 9 MG/DL
CHLORIDE BLD-SCNC: 106 MMOL/L
CO2: 29 MMOL/L
CREAT SERPL-MCNC: 0.9 MG/DL
GFR CALCULATED: 84
GLUCOSE BLD-MCNC: 97 MG/DL
POTASSIUM SERPL-SCNC: 4.3 MMOL/L
SODIUM BLD-SCNC: 145 MMOL/L
VANCOMYCIN TROUGH: <2

## 2019-11-22 LAB
BUN BLDV-MCNC: 19 MG/DL
CALCIUM SERPL-MCNC: 8.5 MG/DL
CHLORIDE BLD-SCNC: 110 MMOL/L
CO2: 32 MMOL/L
CREAT SERPL-MCNC: 0.9 MG/DL
GFR CALCULATED: 84
GLUCOSE BLD-MCNC: 69 MG/DL
POTASSIUM SERPL-SCNC: 4.5 MMOL/L
SODIUM BLD-SCNC: 148 MMOL/L

## 2019-11-29 ENCOUNTER — HOSPITAL ENCOUNTER (EMERGENCY)
Age: 69
Discharge: HOME | End: 2019-11-30
Attending: EMERGENCY MEDICINE
Payer: MEDICARE

## 2019-11-29 ENCOUNTER — APPOINTMENT (OUTPATIENT)
Dept: GENERAL RADIOLOGY | Age: 69
End: 2019-11-29
Payer: MEDICARE

## 2019-11-29 VITALS
OXYGEN SATURATION: 98 % | SYSTOLIC BLOOD PRESSURE: 100 MMHG | TEMPERATURE: 98.2 F | DIASTOLIC BLOOD PRESSURE: 74 MMHG | RESPIRATION RATE: 18 BRPM | HEART RATE: 63 BPM
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
## 2019-11-29 LAB
BILIRUBIN URINE: NEGATIVE
BLOOD, URINE: NEGATIVE
CLARITY: CLEAR
COLOR: ABNORMAL
GLUCOSE BLD-MCNC: 99 MG/DL (ref 60–115)
GLUCOSE URINE: NEGATIVE MG/DL
KETONES, URINE: 15 MG/DL
LEUKOCYTE ESTERASE, URINE: NEGATIVE
NITRITE, URINE: NEGATIVE
PERFORMED ON: NORMAL
PH UA: 6.5 (ref 5–9)
PROTEIN UA: NEGATIVE MG/DL
SPECIFIC GRAVITY UA: 1.03 (ref 1–1.03)
URINE REFLEX TO CULTURE: ABNORMAL
UROBILINOGEN, URINE: 1 E.U./DL

## 2019-11-29 PROCEDURE — 6830039000 HC L3 TRAUMA ALERT

## 2019-11-29 PROCEDURE — 99284 EMERGENCY DEPT VISIT MOD MDM: CPT

## 2019-11-29 PROCEDURE — 81003 URINALYSIS AUTO W/O SCOPE: CPT

## 2019-11-29 PROCEDURE — 72170 X-RAY EXAM OF PELVIS: CPT

## 2019-12-01 ENCOUNTER — APPOINTMENT (OUTPATIENT)
Dept: CT IMAGING | Age: 69
End: 2019-12-01
Payer: MEDICARE

## 2019-12-01 ENCOUNTER — HOSPITAL ENCOUNTER (EMERGENCY)
Age: 69
Discharge: HOME | End: 2019-12-01
Payer: MEDICARE

## 2019-12-01 VITALS
HEART RATE: 84 BPM | OXYGEN SATURATION: 99 % | RESPIRATION RATE: 18 BRPM | TEMPERATURE: 97.8 F | BODY MASS INDEX: 23.11 KG/M2 | DIASTOLIC BLOOD PRESSURE: 78 MMHG | SYSTOLIC BLOOD PRESSURE: 124 MMHG | WEIGHT: 180 LBS

## 2019-12-01 DIAGNOSIS — G25.2 COARSE TREMORS: Primary | ICD-10-CM

## 2019-12-01 DIAGNOSIS — R74.8 CARDIAC ENZYMES ELEVATED: ICD-10-CM

## 2019-12-01 LAB
ALBUMIN SERPL-MCNC: 3.3 G/DL (ref 3.5–4.6)
ALP BLD-CCNC: 81 U/L (ref 35–104)
ALT SERPL-CCNC: 49 U/L (ref 0–41)
AMMONIA: 58 UMOL/L (ref 16–60)
ANION GAP SERPL CALCULATED.3IONS-SCNC: 12 MEQ/L (ref 9–15)
AST SERPL-CCNC: 102 U/L (ref 0–40)
BASOPHILS ABSOLUTE: 0 K/UL (ref 0–0.2)
BASOPHILS RELATIVE PERCENT: 0.7 %
BILIRUB SERPL-MCNC: 0.3 MG/DL (ref 0.2–0.7)
BUN BLDV-MCNC: 20 MG/DL (ref 8–23)
CALCIUM SERPL-MCNC: 8.7 MG/DL (ref 8.5–9.9)
CHLORIDE BLD-SCNC: 103 MEQ/L (ref 95–107)
CK MB: 22.6 NG/ML (ref 0–6.7)
CO2: 26 MEQ/L (ref 20–31)
CREAT SERPL-MCNC: 0.77 MG/DL (ref 0.7–1.2)
CREATINE KINASE-MB INDEX: 0.9 % (ref 0–3.5)
EKG ATRIAL RATE: 65 BPM
EKG P AXIS: -5 DEGREES
EKG P-R INTERVAL: 138 MS
EKG Q-T INTERVAL: 442 MS
EKG QRS DURATION: 82 MS
EKG QTC CALCULATION (BAZETT): 459 MS
EKG R AXIS: 41 DEGREES
EKG T AXIS: 36 DEGREES
EKG VENTRICULAR RATE: 65 BPM
EOSINOPHILS ABSOLUTE: 0.1 K/UL (ref 0–0.7)
EOSINOPHILS RELATIVE PERCENT: 1.7 %
GFR AFRICAN AMERICAN: >60
GFR NON-AFRICAN AMERICAN: >60
GLOBULIN: 2.9 G/DL (ref 2.3–3.5)
GLUCOSE BLD-MCNC: 87 MG/DL (ref 70–99)
HCT VFR BLD CALC: 44.3 % (ref 42–52)
HEMOGLOBIN: 14.5 G/DL (ref 14–18)
LACTIC ACID: 2.1 MMOL/L (ref 0.5–2.2)
LYMPHOCYTES ABSOLUTE: 1.6 K/UL (ref 1–4.8)
LYMPHOCYTES RELATIVE PERCENT: 29.1 %
MAGNESIUM: 2.1 MG/DL (ref 1.7–2.4)
MCH RBC QN AUTO: 31.1 PG (ref 27–31.3)
MCHC RBC AUTO-ENTMCNC: 32.8 % (ref 33–37)
MCV RBC AUTO: 94.7 FL (ref 80–100)
MONOCYTES ABSOLUTE: 0.9 K/UL (ref 0.2–0.8)
MONOCYTES RELATIVE PERCENT: 16.2 %
NEUTROPHILS ABSOLUTE: 2.9 K/UL (ref 1.4–6.5)
NEUTROPHILS RELATIVE PERCENT: 52.3 %
PDW BLD-RTO: 13.9 % (ref 11.5–14.5)
PLATELET # BLD: 215 K/UL (ref 130–400)
POTASSIUM SERPL-SCNC: 3.8 MEQ/L (ref 3.4–4.9)
RBC # BLD: 4.68 M/UL (ref 4.7–6.1)
SODIUM BLD-SCNC: 141 MEQ/L (ref 135–144)
TOTAL CK: 2423 U/L (ref 0–190)
TOTAL PROTEIN: 6.2 G/DL (ref 6.3–8)
TROPONIN: 0.03 NG/ML (ref 0–0.01)
WBC # BLD: 5.5 K/UL (ref 4.8–10.8)

## 2019-12-01 PROCEDURE — 70450 CT HEAD/BRAIN W/O DYE: CPT

## 2019-12-01 PROCEDURE — 80053 COMPREHEN METABOLIC PANEL: CPT

## 2019-12-01 PROCEDURE — 82550 ASSAY OF CK (CPK): CPT

## 2019-12-01 PROCEDURE — 82140 ASSAY OF AMMONIA: CPT

## 2019-12-01 PROCEDURE — 83605 ASSAY OF LACTIC ACID: CPT

## 2019-12-01 PROCEDURE — 85025 COMPLETE CBC W/AUTO DIFF WBC: CPT

## 2019-12-01 PROCEDURE — 2580000003 HC RX 258: Performed by: PHYSICIAN ASSISTANT

## 2019-12-01 PROCEDURE — 93005 ELECTROCARDIOGRAM TRACING: CPT | Performed by: PHYSICIAN ASSISTANT

## 2019-12-01 PROCEDURE — 99284 EMERGENCY DEPT VISIT MOD MDM: CPT

## 2019-12-01 PROCEDURE — 36415 COLL VENOUS BLD VENIPUNCTURE: CPT

## 2019-12-01 PROCEDURE — 72125 CT NECK SPINE W/O DYE: CPT

## 2019-12-01 PROCEDURE — 84484 ASSAY OF TROPONIN QUANT: CPT

## 2019-12-01 PROCEDURE — 83735 ASSAY OF MAGNESIUM: CPT

## 2019-12-01 PROCEDURE — 82553 CREATINE MB FRACTION: CPT

## 2019-12-01 RX ORDER — 0.9 % SODIUM CHLORIDE 0.9 %
1000 INTRAVENOUS SOLUTION INTRAVENOUS ONCE
Status: COMPLETED | OUTPATIENT
Start: 2019-12-01 | End: 2019-12-01

## 2019-12-01 RX ADMIN — SODIUM CHLORIDE 1000 ML: 9 INJECTION, SOLUTION INTRAVENOUS at 20:10

## 2019-12-03 PROCEDURE — 93010 ELECTROCARDIOGRAM REPORT: CPT | Performed by: INTERNAL MEDICINE

## 2019-12-11 LAB
ALBUMIN SERPL-MCNC: 3.3 G/DL
ALP BLD-CCNC: 80 U/L
ALT SERPL-CCNC: 37 U/L
ANION GAP SERPL CALCULATED.3IONS-SCNC: 18 MMOL/L
AST SERPL-CCNC: 37 U/L
BASOPHILS ABSOLUTE: NORMAL
BASOPHILS RELATIVE PERCENT: NORMAL
BILIRUB SERPL-MCNC: 0.2 MG/DL (ref 0.1–1.4)
BUN BLDV-MCNC: 18 MG/DL
CALCIUM SERPL-MCNC: 8.8 MG/DL
CHLORIDE BLD-SCNC: 105 MMOL/L
CO2: 22 MMOL/L
CREAT SERPL-MCNC: 0.84 MG/DL
EOSINOPHILS ABSOLUTE: NORMAL
EOSINOPHILS RELATIVE PERCENT: NORMAL
GFR CALCULATED: 60
GLUCOSE BLD-MCNC: 137 MG/DL
HCT VFR BLD CALC: 43.1 % (ref 41–53)
HEMOGLOBIN: 14.2 G/DL (ref 13.5–17.5)
LYMPHOCYTES ABSOLUTE: NORMAL
LYMPHOCYTES RELATIVE PERCENT: NORMAL
MCH RBC QN AUTO: 32.1 PG
MCHC RBC AUTO-ENTMCNC: 32.9 G/DL
MCV RBC AUTO: 97.5 FL
MONOCYTES ABSOLUTE: NORMAL
MONOCYTES RELATIVE PERCENT: NORMAL
NEUTROPHILS ABSOLUTE: NORMAL
NEUTROPHILS RELATIVE PERCENT: NORMAL
PLATELET # BLD: 269 K/ΜL
PMV BLD AUTO: 10.5 FL
POTASSIUM SERPL-SCNC: 4 MMOL/L
RBC # BLD: 4.42 10^6/ΜL
SODIUM BLD-SCNC: 145 MMOL/L
TOTAL PROTEIN: 6.3
WBC # BLD: 4.97 10^3/ML

## 2019-12-12 VITALS
RESPIRATION RATE: 18 BRPM | TEMPERATURE: 97.4 F | BODY MASS INDEX: 22.34 KG/M2 | SYSTOLIC BLOOD PRESSURE: 134 MMHG | DIASTOLIC BLOOD PRESSURE: 80 MMHG | HEART RATE: 74 BPM | WEIGHT: 174 LBS

## 2019-12-17 VITALS
WEIGHT: 176.4 LBS | TEMPERATURE: 98.9 F | BODY MASS INDEX: 22.65 KG/M2 | OXYGEN SATURATION: 97 % | DIASTOLIC BLOOD PRESSURE: 60 MMHG | RESPIRATION RATE: 20 BRPM | HEART RATE: 74 BPM | SYSTOLIC BLOOD PRESSURE: 134 MMHG

## 2019-12-17 PROBLEM — R56.9 SEIZURE (HCC): Status: ACTIVE | Noted: 2019-12-17

## 2019-12-17 PROBLEM — F03.90 DEMENTIA WITHOUT BEHAVIORAL DISTURBANCE (HCC): Status: ACTIVE | Noted: 2019-12-17

## 2019-12-17 PROBLEM — G93.40 ENCEPHALOPATHY: Status: ACTIVE | Noted: 2019-12-17

## 2019-12-26 ENCOUNTER — HOSPITAL ENCOUNTER (EMERGENCY)
Age: 69
Discharge: HOME | End: 2019-12-26
Attending: EMERGENCY MEDICINE
Payer: MEDICARE

## 2019-12-26 ENCOUNTER — APPOINTMENT (OUTPATIENT)
Dept: GENERAL RADIOLOGY | Age: 69
End: 2019-12-26
Payer: MEDICARE

## 2019-12-26 ENCOUNTER — APPOINTMENT (OUTPATIENT)
Dept: CT IMAGING | Age: 69
End: 2019-12-26
Payer: MEDICARE

## 2019-12-26 VITALS
SYSTOLIC BLOOD PRESSURE: 113 MMHG | OXYGEN SATURATION: 97 % | RESPIRATION RATE: 21 BRPM | DIASTOLIC BLOOD PRESSURE: 81 MMHG | HEART RATE: 74 BPM

## 2019-12-26 DIAGNOSIS — Z00.00 WELL ADULT HEALTH CHECK: Primary | ICD-10-CM

## 2019-12-26 PROCEDURE — 71045 X-RAY EXAM CHEST 1 VIEW: CPT

## 2019-12-26 PROCEDURE — 93005 ELECTROCARDIOGRAM TRACING: CPT | Performed by: EMERGENCY MEDICINE

## 2019-12-26 PROCEDURE — 99283 EMERGENCY DEPT VISIT LOW MDM: CPT

## 2019-12-26 RX ORDER — 0.9 % SODIUM CHLORIDE 0.9 %
1000 INTRAVENOUS SOLUTION INTRAVENOUS ONCE
Status: DISCONTINUED | OUTPATIENT
Start: 2019-12-26 | End: 2019-12-26

## 2019-12-27 PROCEDURE — 93010 ELECTROCARDIOGRAM REPORT: CPT | Performed by: INTERNAL MEDICINE

## 2019-12-28 LAB
EKG ATRIAL RATE: 69 BPM
EKG P AXIS: 67 DEGREES
EKG P-R INTERVAL: 156 MS
EKG Q-T INTERVAL: 448 MS
EKG QRS DURATION: 82 MS
EKG QTC CALCULATION (BAZETT): 480 MS
EKG R AXIS: 51 DEGREES
EKG T AXIS: 61 DEGREES
EKG VENTRICULAR RATE: 69 BPM

## 2020-04-11 ENCOUNTER — OFFICE VISIT (OUTPATIENT)
Dept: GERIATRIC MEDICINE | Age: 70
End: 2020-04-11
Payer: MEDICARE

## 2020-04-11 PROCEDURE — G8420 CALC BMI NORM PARAMETERS: HCPCS | Performed by: FAMILY MEDICINE

## 2020-04-11 PROCEDURE — 1123F ACP DISCUSS/DSCN MKR DOCD: CPT | Performed by: FAMILY MEDICINE

## 2020-04-13 RX ORDER — CHLORTHALIDONE 25 MG/1
25 TABLET ORAL DAILY
Qty: 30 TABLET | Refills: 0 | Status: SHIPPED | OUTPATIENT
Start: 2020-04-13 | End: 2020-07-14 | Stop reason: SDUPTHER

## 2020-04-24 VITALS — DIASTOLIC BLOOD PRESSURE: 97 MMHG | SYSTOLIC BLOOD PRESSURE: 144 MMHG | HEART RATE: 56 BPM | RESPIRATION RATE: 16 BRPM

## 2020-05-01 ENCOUNTER — OFFICE VISIT (OUTPATIENT)
Dept: GERIATRIC MEDICINE | Age: 70
End: 2020-05-01
Payer: MEDICARE

## 2020-05-01 PROCEDURE — 1123F ACP DISCUSS/DSCN MKR DOCD: CPT | Performed by: FAMILY MEDICINE

## 2020-05-01 PROCEDURE — G8420 CALC BMI NORM PARAMETERS: HCPCS | Performed by: FAMILY MEDICINE

## 2020-05-24 ENCOUNTER — OFFICE VISIT (OUTPATIENT)
Dept: GERIATRIC MEDICINE | Age: 70
End: 2020-05-24
Payer: MEDICARE

## 2020-05-24 PROCEDURE — G8420 CALC BMI NORM PARAMETERS: HCPCS | Performed by: FAMILY MEDICINE

## 2020-05-24 PROCEDURE — 1123F ACP DISCUSS/DSCN MKR DOCD: CPT | Performed by: FAMILY MEDICINE

## 2020-05-29 VITALS
SYSTOLIC BLOOD PRESSURE: 130 MMHG | RESPIRATION RATE: 18 BRPM | DIASTOLIC BLOOD PRESSURE: 60 MMHG | TEMPERATURE: 96.2 F | HEART RATE: 80 BPM

## 2020-05-29 VITALS — HEART RATE: 55 BPM | RESPIRATION RATE: 18 BRPM | DIASTOLIC BLOOD PRESSURE: 99 MMHG | SYSTOLIC BLOOD PRESSURE: 154 MMHG

## 2020-05-29 NOTE — PROGRESS NOTES
Lyn Grider : 1950 DOS: 2020     [facility]    CHIEF COMPLAINT: Right hip wound. SUBJECTIVE: I was asked to see this 71-year-old male with medical history significant for progressive dementia and schizophrenia, who was said to have developed a wound on the skin around the right hip region. There is no associated fever or a history of discharge from this site but he was said to have signified that he was having some discomfort in that area, especially when he is being cleaned up. MEDICATIONS: Reviewed. OBJECTIVE: His vital signs include blood pressure 130/60 mmHg, heart rate is 80 per minute, respiratory rate is 18 per minute and temperature is 96.2. He is alert and lungs are clear to auscultations bilaterally. He exhibits audible heart tones, S1 and S2, and skin is warm and dry, except for the resolving lesion on the lateral aspect of the right hip. He also exhibits right unilateral pedal edema with a left BKA. ASSESSMENT AND PLAN: Cellulitis, right hip: Almost completely resolved. Staff will continue to monitor for now.           Electronically Signed By: Faviola Walker MD on 2020 00:10:51  ______________________________  Faviola Walker MD  /[id number]  D: 2020 97:21:96  T: 2020 18:26:04    cc: Barney De La Cruz

## 2020-06-15 ENCOUNTER — OFFICE VISIT (OUTPATIENT)
Dept: GERIATRIC MEDICINE | Age: 70
End: 2020-06-15
Payer: MEDICARE

## 2020-06-15 LAB
ALBUMIN SERPL-MCNC: 3.4 G/DL
ALP BLD-CCNC: 75 U/L
ALT SERPL-CCNC: 10 U/L
ANION GAP SERPL CALCULATED.3IONS-SCNC: 13 MMOL/L
AST SERPL-CCNC: 18 U/L
BILIRUB SERPL-MCNC: 0.3 MG/DL (ref 0.1–1.4)
BUN BLDV-MCNC: 20 MG/DL
CALCIUM SERPL-MCNC: 9.2 MG/DL
CHLORIDE BLD-SCNC: 97 MMOL/L
CO2: 31 MMOL/L
CREAT SERPL-MCNC: 0.86 MG/DL
GFR CALCULATED: 60
GLUCOSE BLD-MCNC: 127 MG/DL
POTASSIUM SERPL-SCNC: 4.3 MMOL/L
SODIUM BLD-SCNC: 141 MMOL/L
TOTAL PROTEIN: 6.6

## 2020-06-15 PROCEDURE — 1123F ACP DISCUSS/DSCN MKR DOCD: CPT | Performed by: FAMILY MEDICINE

## 2020-06-15 PROCEDURE — G8420 CALC BMI NORM PARAMETERS: HCPCS | Performed by: FAMILY MEDICINE

## 2020-06-16 LAB
BASOPHILS ABSOLUTE: NORMAL
BASOPHILS RELATIVE PERCENT: NORMAL
EOSINOPHILS ABSOLUTE: NORMAL
EOSINOPHILS RELATIVE PERCENT: NORMAL
HCT VFR BLD CALC: 44.7 % (ref 41–53)
HEMOGLOBIN: 14.6 G/DL (ref 13.5–17.5)
LYMPHOCYTES ABSOLUTE: NORMAL
LYMPHOCYTES RELATIVE PERCENT: NORMAL
MCH RBC QN AUTO: 31.1 PG
MCHC RBC AUTO-ENTMCNC: 32.7 G/DL
MCV RBC AUTO: 95.3 FL
MONOCYTES ABSOLUTE: NORMAL
MONOCYTES RELATIVE PERCENT: NORMAL
NEUTROPHILS ABSOLUTE: NORMAL
NEUTROPHILS RELATIVE PERCENT: NORMAL
PLATELET # BLD: 282 K/ΜL
PMV BLD AUTO: 12.3 FL
RBC # BLD: 4.69 10^6/ΜL
WBC # BLD: 9.04 10^3/ML

## 2020-06-20 RX ORDER — CHLORTHALIDONE 25 MG/1
TABLET ORAL
Qty: 30 TABLET | Refills: 10 | OUTPATIENT
Start: 2020-06-20

## 2020-06-25 RX ORDER — CHLORTHALIDONE 25 MG/1
TABLET ORAL
Qty: 30 TABLET | Refills: 0 | OUTPATIENT
Start: 2020-06-25

## 2020-07-08 RX ORDER — CHLORTHALIDONE 25 MG/1
TABLET ORAL
Qty: 30 TABLET | Refills: 0 | OUTPATIENT
Start: 2020-07-08

## 2020-07-13 RX ORDER — CHLORTHALIDONE 25 MG/1
TABLET ORAL
Qty: 30 TABLET | Refills: 4 | OUTPATIENT
Start: 2020-07-13

## 2020-07-17 RX ORDER — CHLORTHALIDONE 25 MG/1
25 TABLET ORAL DAILY
Qty: 30 TABLET | Refills: 2 | Status: SHIPPED | OUTPATIENT
Start: 2020-07-17

## 2020-07-21 ENCOUNTER — OFFICE VISIT (OUTPATIENT)
Dept: GERIATRIC MEDICINE | Age: 70
End: 2020-07-21
Payer: MEDICARE

## 2020-07-21 VITALS — DIASTOLIC BLOOD PRESSURE: 67 MMHG | RESPIRATION RATE: 14 BRPM | HEART RATE: 70 BPM | SYSTOLIC BLOOD PRESSURE: 114 MMHG

## 2020-07-21 PROBLEM — G89.29 CHRONIC KNEE PAIN AFTER TOTAL REPLACEMENT OF RIGHT KNEE JOINT: Status: ACTIVE | Noted: 2018-11-29

## 2020-07-21 PROBLEM — I87.8 VENOUS STASIS OF LOWER EXTREMITY: Status: ACTIVE | Noted: 2020-07-21

## 2020-07-21 PROBLEM — M19.90 OSTEOARTHROSIS: Status: ACTIVE | Noted: 2019-08-08

## 2020-07-21 PROBLEM — M25.561 CHRONIC KNEE PAIN AFTER TOTAL REPLACEMENT OF RIGHT KNEE JOINT: Status: ACTIVE | Noted: 2018-11-29

## 2020-07-21 PROBLEM — Z96.651 CHRONIC KNEE PAIN AFTER TOTAL REPLACEMENT OF RIGHT KNEE JOINT: Status: ACTIVE | Noted: 2018-11-29

## 2020-07-21 PROCEDURE — G8420 CALC BMI NORM PARAMETERS: HCPCS | Performed by: INTERNAL MEDICINE

## 2020-07-21 PROCEDURE — 1123F ACP DISCUSS/DSCN MKR DOCD: CPT | Performed by: INTERNAL MEDICINE

## 2020-07-21 RX ORDER — POTASSIUM CHLORIDE 20 MEQ/1
20 TABLET, EXTENDED RELEASE ORAL DAILY
Qty: 30 TABLET | Refills: 0
Start: 2020-07-21 | End: 2020-08-20

## 2020-07-21 RX ORDER — FUROSEMIDE 40 MG/1
40 TABLET ORAL DAILY
Qty: 30 TABLET | Refills: 0
Start: 2020-07-21

## 2020-07-21 ASSESSMENT — ENCOUNTER SYMPTOMS
BACK PAIN: 1
SHORTNESS OF BREATH: 0
ABDOMINAL DISTENTION: 0

## 2020-07-21 NOTE — PROGRESS NOTES
Lynn Gamboa is a 72 y.o. male with dementia, psychosis, CAD, left AKA,  whom I am seeing at [facility] at the request of the nursing staff for evaluation of an ulcer of the dorsum of the right foot. There is no history of trauma. The wound has been covered with sterile dry dressing and the foot bandaged. The nursing staff needs referral for home care nurse to care for the wound. The patient was seen with his/her consent in a private area at the facility. I also spoke with the nurse and reviewed the available records in [address] [place] [address] Rd. Chief Complaint   Patient presents with    Wound Check    Leg Swelling       The following laboratory reports since the past visit were reviewed:    Orders Only on 06/16/2020   Component Date Value Ref Range Status    Sodium 06/15/2020 141  mmol/L Final    Chloride 06/15/2020 97  mmol/L Final    Potassium 06/15/2020 4.3  mmol/L Final    BUN 06/15/2020 20  mg/dL Final    CREATININE 06/15/2020 0.86   Final    Glucose 06/15/2020 127  mg/dL Final    AST 06/15/2020 18  U/L Final    ALT 06/15/2020 10  U/L Final    Calcium 06/15/2020 9.2  mg/dL Final    Total Protein 06/15/2020 6.6   Final    CO2 06/15/2020 31  mmol/L Final    Alb 06/15/2020 3.4   Final    Alkaline Phosphatase 06/15/2020 75  U/L Final    Total Bilirubin 06/15/2020 0.3  0.1 - 1.4 mg/dL Final    Gfr Calculated 06/15/2020 60   Final    Anion Gap 06/15/2020 13  mmol/L Final    WBC 06/16/2020 9.04  10^3/mL Final    Hemoglobin 06/16/2020 14.6  13.5 - 17.5 g/dL Final    Hematocrit 06/16/2020 44.7  41 - 53 % Final    Platelets 06/92/6261 282  K/µL Final    RBC 06/16/2020 4.69  10^6/µL Final    MCV 06/16/2020 95.3  fL Final    MCH 06/16/2020 31.1  pg Final    MCHC 06/16/2020 32.7  g/dL Final    MPV 06/16/2020 12.3  fL Final       HPI:    Wound Check   Prior ED Treatment: dry sterile dressing. There has been clear discharge from the wound. There is no redness present. There is no swelling present.  There is no pain present. He has no difficulty moving the affected extremity or digit. More detail above in the chiefcomplaint(s), interim history and below in the review of systems. Past Medical History:   Diagnosis Date    BPH (benign prostatic hyperplasia)     CAD (coronary artery disease)     Carotid atherosclerosis, bilateral     Chronic lower back pain     [facility] 2019    Depressive psychosis ([facility]     Emphysema of lung (HCC)     Erectile dysfunction     Extensive tattoos     Generalized osteoarthrosis     (severe elbow and hips)    HTN (hypertension)     Hypercholesteremia     Lipoma     of the arms    Lumbar pain     Neuropathy     Nicotine abuse        Past Surgical History:   Procedure Laterality Date    ABOVE KNEE AMPUTATION Left     ANGIOPLASTY      ABOUT 15 YRS AGO    CARDIAC SURGERY      3 stents ?     [facility]    [facility]    COLONOSCOPY  14,,    polyps jarmoszuk    [facility]2016    [facility]       Social History     Socioeconomic History    Marital status: Single     Spouse name: Not on file    Number of children: Not on file    Years of education: Not on file    Highest education level: Not on file   Occupational History    Not on file   Social Needs    Financial resource strain: Not on file    Food insecurity     Worry: Not on file     Inability: Not on file   [employer] needs     Medical: Not on file     Non-medical: Not on file   Tobacco Use    Smoking status: Former Smoker     Packs/day: 0.80     Years: 30.00     Pack years: 24.00     Types: Cigarettes     Last attempt to quit: 3/24/2018     Years since quittin.3    Smokeless tobacco: Never Used   Substance and Sexual Activity    Alcohol use: No     Comment: denies, no alcoholx 6 years    Drug use: Not on file    Sexual activity: Not on file   Lifestyle    Physical activity     Days per week: Not on file Minutes per session: Not on file    Stress: Not on file   Relationships    Social connections     Talks on phone: Not on file     Gets together: Not on file     Attends [employer] service: Not on file     Active member of club or organization: Not on file     Attends meetings of clubs or organizations: Not on file     Relationship status: Not on file    Intimate partner violence     Fear of current or ex partner: Not on file     Emotionally abused: Not on file     Physically abused: Not on file     Forced sexual activity: Not on file   Other Topics Concern    Not on file   Social History Narrative    Not on file       Family History   Problem Relation Age of Onset    Cancer Mother     Diabetes Sister     Heart Disease Brother        Family and socialhistory were reviewed and pertinent changes documented. ALLERGIES    Patient has no known allergies. Current Outpatient Medications on File Prior to Visit   Medication Sig Dispense Refill    chlorthalidone (HYGROTON) 25 MG tablet Take 1 tablet by mouth daily 30 tablet 2    vitamin B-1 (THIAMINE) 100 MG tablet Take 100 mg by mouth daily      ARIPiprazole (ABILIFY) 30 MG tablet Take 30 mg by mouth daily      albuterol (ACCUNEB) 0.63 MG/3ML nebulizer solution Take 1 ampule by nebulization every 6 hours as needed for Wheezing      aspirin 81 MG tablet Take 81 mg by mouth daily      valproic acid (DEPAKENE) 250 MG capsule Take 500 mg by mouth 4 times daily      folic acid (FOLVITE) 1 MG tablet Take 1 mg by mouth daily      enoxaparin (LOVENOX) 40 MG/0.4ML injection Inject 1 mg/kg into the skin daily      melatonin 3 MG TABS tablet Take 3 mg by mouth nightly as needed      prasugrel (EFFIENT) 10 MG TABS Take 10 mg by mouth daily      atorvastatin (LIPITOR) 80 MG tablet Take 1 tablet by mouth nightly 90 tablet 0    HYDROcodone-acetaminophen (NORCO)  MG per tablet Take 1 tablet by mouth every 6 hours as needed for Pain for up to 30 days. Kiera Chung Fill Date: 5/30/18 120 tablet 0    Handicap Placard MISC by Does not apply route Duration of 5 years. Dx.Lumbar pain,osteoarthritis 1 each 0    nitroGLYCERIN (NITROSTAT) 0.4 MG SL tablet Place 1 tablet under the tongue every 5 minutes as needed for Chest pain 25 tablet 1     No current facility-administered medications on file prior to visit.      multivitamins(MULTIPLE VITAMINS TAB)   Take one(1) tablet daily.   0 01/25/2010   Active   HERBAL DRUGS ORAL LIQUID   PROPOLIS TINCTURE-20 DROPS DAILY   0 01/25/2010   Active   ALPRAZolam (XANAX) 0.5 mg tablet   Take 1 tablet by mouth at bedtime as needed.   0 07/22/2016   Active   prasugrel (EFFIENT) 10 mg tab   Take 1 tablet by mouth once daily.   0 07/22/2016   Active   aspirin-calcium carbonate 81 mg-300 mg calcium(777 mg) tab   Take 81 mg by mouth once daily.   0     Active   BEE POLLEN ORAL   Take 1 tablet by mouth once daily.   0 03/29/2013   Active   HYDROcodone-Acetaminophen (NORCO)  mg per tablet   Take 1 tablet by mouth every 6 hours as needed.   0 05/30/2018   Active   nitroglycerin sublingual (NITROSTAT) 0.4 mg SL tablet    Indications: Essential hypertension Dissolve 1 tablet under the tongue every 5 minutes as needed. 25 Bottle of 25   5 03/28/2019   Active   atorvastatin (LIPITOR) 80 mg tablet    Indications: Hyperlipidemia, mixed Take 1 tablet by mouth once daily. 30 tablet   11 03/28/2019   Active   donepezil (ARICEPT) 10 mg tablet    Indications: Memory deficit Take 1 tablet by mouth daily at bedtime. 30 tablet   11 03/28/2019   Active       Additional Information  Patient taking differently: 10 mg ORAL 2 TIMES DAILY, Reason: Changing Therapy/Dosage Form, Reported on 9/11/2019 10:56 AM     Food Supplement, Lactose-Free (ENSURE HIGH PROTEIN) liqd   Take 237 mL by mouth three times daily with meals.  90 Bottle   3 03/28/2019   Active   ARIPiprazole (ABILIFY) 30 mg tablet   Take 30 mg by mouth once daily.   0     Active   hydrocodone bit/homatrop me-br (HYDROCODONE COMPOUND ORAL)   Take  mg by mouth as needed.   0     Active   divalproex ER (DEPAKOTE ER) 500 mg 24 hr tablet   Take 1 tablet by mouth every morning. 30 tablet   3 09/11/2019   Active         Review of Systems   Constitutional: Negative for fever. Respiratory: Negative for shortness of breath. Cardiovascular: Positive for leg swelling. Negative for chest pain. Gastrointestinal: Negative for abdominal distention. Musculoskeletal: Positive for arthralgias and back pain. Skin: Negative for rash. Neurological: Negative for speech difficulty. Psychiatric/Behavioral: Positive for confusion, decreased concentration and dysphoric mood. Vitals:    07/21/20 1546   BP: 114/67   Pulse: 70   Resp: 14       Physical Exam  Constitutional:       Appearance: He is normal weight. He is not ill-appearing. Comments: Seated in a wheelchair, not in distress. Age-appropriate, chronically ill-appearing. Status post left AKA   HENT:      Head: Atraumatic. Eyes:      General: No scleral icterus. Neck:      Musculoskeletal: No neck rigidity. Cardiovascular:      Rate and Rhythm: Normal rate. Pulses:           Radial pulses are 2+ on the right side and 2+ on the left side. Posterior tibial pulses are 1+ on the right side. Heart sounds: Heart sounds are distant. No gallop. Pulmonary:      Effort: No respiratory distress. Abdominal:      General: There is no distension. Musculoskeletal:      Right lower leg: 3+ Edema (distal, below knee) present. Right foot: Decreased range of motion. Feet:    Feet:      Right foot:      Skin integrity: Ulcer present. Left foot:      Amputation: Left leg is amputated above knee. Comments: Exam of the dorsum of the right foot reveals presence of an irregularly-shaped 2 cm superficial ulcer with slight surrounding erythema, no significant drainage. Skin:     General: Skin is dry. Findings: No bruising. Neurological:      Mental Status: Mental status is at baseline. Coordination: Coordination normal.   Psychiatric:         Mood and Affect: Mood is anxious and depressed. Speech: Speech is delayed. Behavior: Behavior is slowed and withdrawn. Cognition and Memory: Cognition is impaired. Assessment:    Vivien Borja was seen today for wound check and leg swelling. Diagnoses and all orders for this visit:    Skin ulcer of right foot, limited to breakdown of skin ([address]               Since wound care cannot be done by nurses at the facility, referral for home health care to treat the wound to resolution. Avoid trauma to the dorsum of the foot. Continue sterile dry dressing and bandage for now. Edema of right lower leg due to venous stasis               Elevate leg to 45 degrees while in the wheelchair, to avoid excessive pressure on the back of the thigh. Decrease Lasix to 40 mg daily (with potassium supplement), check BMP. May consider compression stocking if tolerated. Plan:    See all orders and comments in the assessment section. Reviewed with the patient's caregiver today's diagnosis and associated problems, treatment plans, prognosis, questions answered. Order for medication change, home health nurse for wound care placed in the chart. Close follow up needed to evaluate treatment results and for care coordination. Follow up in one week by CNP or by MD.     I have reviewed the patient's medical and surgical, family and social history, health maintenance schedule, and updated the computerized patient record. Please note this report has been partially produced by using speech recognition hardware. It may contain errors related to the system, including grammar, punctuation and spelling as well as words and phrases that may seem inaccurate. For anyquestions or concerns, please feel free to contact me for clarification. Electronically signed by Sangeeta White MD

## 2020-07-22 ENCOUNTER — TELEPHONE (OUTPATIENT)
Dept: ENDOCRINOLOGY | Age: 70
End: 2020-07-22

## 2020-07-22 NOTE — TELEPHONE ENCOUNTER
Dunia calling from [facility] asking for a verbal order for skilled nursing for evaluate and treat. Call her at [phone]. Also she asked for recent Office note to be faxed [phone], I have faxed this.

## 2020-08-08 ENCOUNTER — APPOINTMENT (OUTPATIENT)
Dept: GENERAL RADIOLOGY | Age: 70
DRG: 689 | End: 2020-08-08
Payer: MEDICARE

## 2020-08-08 ENCOUNTER — HOSPITAL ENCOUNTER (INPATIENT)
Age: 70
LOS: 3 days | Discharge: HOSPICE | DRG: 689 | End: 2020-08-11
Attending: FAMILY MEDICINE | Admitting: FAMILY MEDICINE
Payer: MEDICARE

## 2020-08-08 DIAGNOSIS — R41.0 DISORIENTATION: ICD-10-CM

## 2020-08-08 DIAGNOSIS — N39.0 URINARY TRACT INFECTION WITHOUT HEMATURIA, SITE UNSPECIFIED: Primary | ICD-10-CM

## 2020-08-08 LAB
ALBUMIN SERPL-MCNC: 3.6 G/DL (ref 3.5–4.6)
ALP BLD-CCNC: 93 U/L (ref 35–104)
ALT SERPL-CCNC: 9 U/L (ref 0–41)
ANION GAP SERPL CALCULATED.3IONS-SCNC: 12 MEQ/L (ref 9–15)
AST SERPL-CCNC: 19 U/L (ref 0–40)
BACTERIA: ABNORMAL /HPF
BASOPHILS ABSOLUTE: 0.1 K/UL (ref 0–0.2)
BASOPHILS RELATIVE PERCENT: 0.4 %
BILIRUB SERPL-MCNC: 0.7 MG/DL (ref 0.2–0.7)
BILIRUBIN URINE: NEGATIVE
BLOOD, URINE: ABNORMAL
BUN BLDV-MCNC: 25 MG/DL (ref 8–23)
CALCIUM SERPL-MCNC: 8.9 MG/DL (ref 8.5–9.9)
CHLORIDE BLD-SCNC: 93 MEQ/L (ref 95–107)
CLARITY: ABNORMAL
CO2: 35 MEQ/L (ref 20–31)
COLOR: ABNORMAL
CREAT SERPL-MCNC: 0.94 MG/DL (ref 0.7–1.2)
EKG ATRIAL RATE: 89 BPM
EKG P AXIS: 66 DEGREES
EKG P-R INTERVAL: 176 MS
EKG Q-T INTERVAL: 480 MS
EKG QRS DURATION: 98 MS
EKG QTC CALCULATION (BAZETT): 584 MS
EKG R AXIS: 53 DEGREES
EKG T AXIS: 55 DEGREES
EKG VENTRICULAR RATE: 89 BPM
EOSINOPHILS ABSOLUTE: 0 K/UL (ref 0–0.7)
EOSINOPHILS RELATIVE PERCENT: 0 %
EPITHELIAL CELLS, UA: ABNORMAL /HPF (ref 0–5)
GFR AFRICAN AMERICAN: >60
GFR NON-AFRICAN AMERICAN: >60
GLOBULIN: 3.3 G/DL (ref 2.3–3.5)
GLUCOSE BLD-MCNC: 138 MG/DL (ref 70–99)
GLUCOSE URINE: NEGATIVE MG/DL
HCT VFR BLD CALC: 41.1 % (ref 42–52)
HEMOGLOBIN: 14.1 G/DL (ref 14–18)
KETONES, URINE: ABNORMAL MG/DL
LEUKOCYTE ESTERASE, URINE: ABNORMAL
LYMPHOCYTES ABSOLUTE: 0.6 K/UL (ref 1–4.8)
LYMPHOCYTES RELATIVE PERCENT: 4.8 %
MCH RBC QN AUTO: 30.9 PG (ref 27–31.3)
MCHC RBC AUTO-ENTMCNC: 34.2 % (ref 33–37)
MCV RBC AUTO: 90.2 FL (ref 80–100)
MONOCYTES ABSOLUTE: 1.2 K/UL (ref 0.2–0.8)
MONOCYTES RELATIVE PERCENT: 10 %
NEUTROPHILS ABSOLUTE: 10.5 K/UL (ref 1.4–6.5)
NEUTROPHILS RELATIVE PERCENT: 84.8 %
NITRITE, URINE: POSITIVE
PDW BLD-RTO: 13.2 % (ref 11.5–14.5)
PH UA: 7 (ref 5–9)
PLATELET # BLD: 240 K/UL (ref 130–400)
POTASSIUM SERPL-SCNC: 3.2 MEQ/L (ref 3.4–4.9)
PROTEIN UA: 100 MG/DL
RBC # BLD: 4.56 M/UL (ref 4.7–6.1)
RBC UA: >100 /HPF (ref 0–5)
SODIUM BLD-SCNC: 140 MEQ/L (ref 135–144)
SPECIFIC GRAVITY UA: 1.01 (ref 1–1.03)
TOTAL PROTEIN: 6.9 G/DL (ref 6.3–8)
URINE REFLEX TO CULTURE: YES
UROBILINOGEN, URINE: 1 E.U./DL
WBC # BLD: 12.4 K/UL (ref 4.8–10.8)
WBC UA: >100 /HPF (ref 0–5)

## 2020-08-08 PROCEDURE — 85025 COMPLETE CBC W/AUTO DIFF WBC: CPT

## 2020-08-08 PROCEDURE — 93005 ELECTROCARDIOGRAM TRACING: CPT | Performed by: PHYSICIAN ASSISTANT

## 2020-08-08 PROCEDURE — 36415 COLL VENOUS BLD VENIPUNCTURE: CPT

## 2020-08-08 PROCEDURE — 6360000002 HC RX W HCPCS: Performed by: PHYSICIAN ASSISTANT

## 2020-08-08 PROCEDURE — 96361 HYDRATE IV INFUSION ADD-ON: CPT

## 2020-08-08 PROCEDURE — 99285 EMERGENCY DEPT VISIT HI MDM: CPT

## 2020-08-08 PROCEDURE — 6360000002 HC RX W HCPCS: Performed by: NURSE PRACTITIONER

## 2020-08-08 PROCEDURE — 87040 BLOOD CULTURE FOR BACTERIA: CPT

## 2020-08-08 PROCEDURE — 80053 COMPREHEN METABOLIC PANEL: CPT

## 2020-08-08 PROCEDURE — 94664 DEMO&/EVAL PT USE INHALER: CPT

## 2020-08-08 PROCEDURE — 96365 THER/PROPH/DIAG IV INF INIT: CPT

## 2020-08-08 PROCEDURE — 2580000003 HC RX 258: Performed by: NURSE PRACTITIONER

## 2020-08-08 PROCEDURE — 1210000000 HC MED SURG R&B

## 2020-08-08 PROCEDURE — 87186 SC STD MICRODIL/AGAR DIL: CPT

## 2020-08-08 PROCEDURE — 87077 CULTURE AEROBIC IDENTIFY: CPT

## 2020-08-08 PROCEDURE — 81001 URINALYSIS AUTO W/SCOPE: CPT

## 2020-08-08 PROCEDURE — 2580000003 HC RX 258: Performed by: PHYSICIAN ASSISTANT

## 2020-08-08 PROCEDURE — 71045 X-RAY EXAM CHEST 1 VIEW: CPT

## 2020-08-08 PROCEDURE — U0003 INFECTIOUS AGENT DETECTION BY NUCLEIC ACID (DNA OR RNA); SEVERE ACUTE RESPIRATORY SYNDROME CORONAVIRUS 2 (SARS-COV-2) (CORONAVIRUS DISEASE [COVID-19]), AMPLIFIED PROBE TECHNIQUE, MAKING USE OF HIGH THROUGHPUT TECHNOLOGIES AS DESCRIBED BY CMS-2020-01-R: HCPCS

## 2020-08-08 PROCEDURE — 6370000000 HC RX 637 (ALT 250 FOR IP): Performed by: FAMILY MEDICINE

## 2020-08-08 PROCEDURE — 96372 THER/PROPH/DIAG INJ SC/IM: CPT

## 2020-08-08 PROCEDURE — 87086 URINE CULTURE/COLONY COUNT: CPT

## 2020-08-08 PROCEDURE — 6370000000 HC RX 637 (ALT 250 FOR IP): Performed by: NURSE PRACTITIONER

## 2020-08-08 RX ORDER — SODIUM CHLORIDE 9 MG/ML
INJECTION, SOLUTION INTRAVENOUS CONTINUOUS
Status: DISPENSED | OUTPATIENT
Start: 2020-08-08 | End: 2020-08-09

## 2020-08-08 RX ORDER — ACETAMINOPHEN 325 MG/1
650 TABLET ORAL EVERY 6 HOURS PRN
Status: DISCONTINUED | OUTPATIENT
Start: 2020-08-08 | End: 2020-08-12 | Stop reason: HOSPADM

## 2020-08-08 RX ORDER — SODIUM CHLORIDE 0.9 % (FLUSH) 0.9 %
10 SYRINGE (ML) INJECTION PRN
Status: DISCONTINUED | OUTPATIENT
Start: 2020-08-08 | End: 2020-08-12 | Stop reason: HOSPADM

## 2020-08-08 RX ORDER — VALPROIC ACID 250 MG/1
500 CAPSULE, LIQUID FILLED ORAL 4 TIMES DAILY
Status: DISCONTINUED | OUTPATIENT
Start: 2020-08-08 | End: 2020-08-11 | Stop reason: RX

## 2020-08-08 RX ORDER — AMMONIUM LACTATE 12 G/100G
LOTION TOPICAL PRN
Status: DISCONTINUED | OUTPATIENT
Start: 2020-08-08 | End: 2020-08-12 | Stop reason: HOSPADM

## 2020-08-08 RX ORDER — ALBUTEROL SULFATE 0.63 MG/3ML
1 SOLUTION RESPIRATORY (INHALATION) EVERY 6 HOURS PRN
Status: DISCONTINUED | OUTPATIENT
Start: 2020-08-08 | End: 2020-08-12 | Stop reason: HOSPADM

## 2020-08-08 RX ORDER — POLYETHYLENE GLYCOL 3350 17 G/17G
17 POWDER, FOR SOLUTION ORAL DAILY PRN
Status: DISCONTINUED | OUTPATIENT
Start: 2020-08-08 | End: 2020-08-12 | Stop reason: HOSPADM

## 2020-08-08 RX ORDER — PROMETHAZINE HYDROCHLORIDE 12.5 MG/1
12.5 TABLET ORAL EVERY 6 HOURS PRN
Status: DISCONTINUED | OUTPATIENT
Start: 2020-08-08 | End: 2020-08-09

## 2020-08-08 RX ORDER — ACETAMINOPHEN 650 MG/1
650 SUPPOSITORY RECTAL EVERY 6 HOURS PRN
Status: DISCONTINUED | OUTPATIENT
Start: 2020-08-08 | End: 2020-08-12 | Stop reason: HOSPADM

## 2020-08-08 RX ORDER — POTASSIUM CHLORIDE 20 MEQ/1
20 TABLET, EXTENDED RELEASE ORAL DAILY
Status: DISCONTINUED | OUTPATIENT
Start: 2020-08-08 | End: 2020-08-12 | Stop reason: HOSPADM

## 2020-08-08 RX ORDER — SODIUM CHLORIDE 0.9 % (FLUSH) 0.9 %
10 SYRINGE (ML) INJECTION EVERY 12 HOURS SCHEDULED
Status: DISCONTINUED | OUTPATIENT
Start: 2020-08-08 | End: 2020-08-12 | Stop reason: HOSPADM

## 2020-08-08 RX ORDER — ARIPIPRAZOLE 15 MG/1
30 TABLET ORAL DAILY
Status: DISCONTINUED | OUTPATIENT
Start: 2020-08-08 | End: 2020-08-12 | Stop reason: HOSPADM

## 2020-08-08 RX ORDER — ONDANSETRON 2 MG/ML
4 INJECTION INTRAMUSCULAR; INTRAVENOUS EVERY 6 HOURS PRN
Status: DISCONTINUED | OUTPATIENT
Start: 2020-08-08 | End: 2020-08-12 | Stop reason: HOSPADM

## 2020-08-08 RX ORDER — CHLORTHALIDONE 25 MG/1
25 TABLET ORAL DAILY
Status: DISCONTINUED | OUTPATIENT
Start: 2020-08-08 | End: 2020-08-12 | Stop reason: HOSPADM

## 2020-08-08 RX ORDER — 0.9 % SODIUM CHLORIDE 0.9 %
1000 INTRAVENOUS SOLUTION INTRAVENOUS ONCE
Status: COMPLETED | OUTPATIENT
Start: 2020-08-08 | End: 2020-08-08

## 2020-08-08 RX ORDER — FUROSEMIDE 40 MG/1
40 TABLET ORAL DAILY
Status: DISCONTINUED | OUTPATIENT
Start: 2020-08-08 | End: 2020-08-12 | Stop reason: HOSPADM

## 2020-08-08 RX ORDER — ASPIRIN 81 MG/1
81 TABLET ORAL DAILY
Status: DISCONTINUED | OUTPATIENT
Start: 2020-08-08 | End: 2020-08-12 | Stop reason: HOSPADM

## 2020-08-08 RX ADMIN — CEFTRIAXONE SODIUM 1 G: 1 INJECTION, POWDER, FOR SOLUTION INTRAMUSCULAR; INTRAVENOUS at 10:57

## 2020-08-08 RX ADMIN — VALPROIC ACID 500 MG: 250 CAPSULE, LIQUID FILLED ORAL at 23:17

## 2020-08-08 RX ADMIN — ENOXAPARIN SODIUM 40 MG: 40 INJECTION SUBCUTANEOUS at 16:58

## 2020-08-08 RX ADMIN — SODIUM CHLORIDE 1000 ML: 9 INJECTION, SOLUTION INTRAVENOUS at 09:31

## 2020-08-08 RX ADMIN — ACETAMINOPHEN 650 MG: 650 SUPPOSITORY RECTAL at 16:58

## 2020-08-08 RX ADMIN — SODIUM CHLORIDE: 9 INJECTION, SOLUTION INTRAVENOUS at 16:58

## 2020-08-08 RX ADMIN — CEFTRIAXONE SODIUM 1 G: 1 INJECTION, POWDER, FOR SOLUTION INTRAMUSCULAR; INTRAVENOUS at 23:30

## 2020-08-08 RX ADMIN — POTASSIUM CHLORIDE 20 MEQ: 20 TABLET, EXTENDED RELEASE ORAL at 23:17

## 2020-08-08 ASSESSMENT — ENCOUNTER SYMPTOMS
CONSTIPATION: 0
COUGH: 0
VOMITING: 0
NAUSEA: 0
COLOR CHANGE: 0
SHORTNESS OF BREATH: 0
ABDOMINAL PAIN: 0
RECTAL PAIN: 0
SORE THROAT: 0
RHINORRHEA: 0
EYE DISCHARGE: 0
ABDOMINAL DISTENTION: 0

## 2020-08-08 ASSESSMENT — PAIN SCALES - GENERAL
PAINLEVEL_OUTOF10: 0
PAINLEVEL_OUTOF10: 0

## 2020-08-08 NOTE — ED TRIAGE NOTES
Pt here from [address] [place] [address]  via [facility] for complaints of hypertension. Staff reported pt felt warm.

## 2020-08-08 NOTE — ED NOTES
Patient presents to the ER from [address] [place] [address] with staffing stated that he was hypertensive and tachycardiac  When EMS arrived, patient was 107/72 and HR 86  Patient has history of dementia and Parkinsons, tremor to right arm which is baseline per son whom is at bedside  Patient is speaking only minimal but not answering questions appropriately   Lungs clear but patient will not take a deep breath in for full assessment  Respirations even and unlabored  Wound to right foot, mepilex was applied prior to arrival, swelling to top of right foot noted but wound looks to be healing well, no drainage at site       Olimpia Hernandez, RN  08/08/20 0289

## 2020-08-08 NOTE — ED PROVIDER NOTES
[address] [facility] ED  eMERGENCY dEPARTMENT eNCOUnter      Pt Name: Emely Vega  MRN: [id number]  [place] 1950  Date of evaluation: 8/8/2020  Provider: Efren Cruz PA-C    CHIEF COMPLAINT       Chief Complaint   Patient presents with    Hypertension     Not hypertensive for Squad and upon arrival 113/81    Tachycardia     HR 88         HISTORY OF PRESENT ILLNESS   (Location/Symptom, Timing/Onset,Context/Setting, Quality, Duration, Modifying Factors, Severity)  Note limiting factors. Emely Vega is a 72 y.o. male who presents to the emergency department patient presents from local nursing home with concerns per staff of hypertension, tachycardia. Patient is dementia patient, he is unable to contribute to his own history. Appears in no acute distress. He is not present with a fever on arrival to the emerge department, he is not hypotensive or tachycardic. Past medical history dementia, CVA, emphysema, hypertension, hypercholesterolemia. HPI    NursingNotes were reviewed. REVIEW OF SYSTEMS    (2-9 systems for level 4, 10 or more for level 5)     Review of Systems   Constitutional: Positive for fever. Negative for activity change and appetite change. HENT: Negative for congestion, ear discharge, ear pain, nosebleeds, rhinorrhea and sore throat. Eyes: Negative for discharge. Respiratory: Negative for cough and shortness of breath. Cardiovascular: Negative for chest pain, palpitations and leg swelling. Gastrointestinal: Negative for abdominal distention, abdominal pain, constipation, nausea, rectal pain and vomiting. Genitourinary: Negative for difficulty urinating and dysuria. Musculoskeletal: Negative for arthralgias. Skin: Negative for color change, pallor, rash and wound. Wound to dorsal surface right foot. Neurological: Negative for dizziness, tremors, syncope, weakness, numbness and headaches.    Psychiatric/Behavioral: Negative for agitation and confusion. Except as noted above the remainder of the review of systems was reviewed and negative. PAST MEDICAL HISTORY     Past Medical History:   Diagnosis Date    BPH (benign prostatic hyperplasia)     CAD (coronary artery disease)     Carotid atherosclerosis, bilateral     Cerebrovascular small vessel disease 2015    per MRI    Chronic lower back pain     Dementia ([facility] [address] 2019    Depressive psychosis ([facility] [address] [facility]     Emphysema of lung ([facility] [address] [facility]     Erectile dysfunction     Extensive tattoos     Generalized osteoarthrosis     (severe elbow and hips)    HTN (hypertension)     Hypercholesteremia     Lipoma     of the arms    Lumbar pain     Neuropathy     Nicotine abuse          SURGICALHISTORY       Past Surgical History:   Procedure Laterality Date    ABOVE KNEE AMPUTATION Left     ANGIOPLASTY      ABOUT 15 YRS AGO    CARDIAC SURGERY      3 stents 2013?  [facility]    [facility]2016    [facility]    COLONOSCOPY  7/30/14,7/09,6/04    polyps jarmoszuk    [facility]Y [facility]15/2016    [facility]         CURRENT MEDICATIONS       Previous Medications    ALBUTEROL (ACCUNEB) 0.63 MG/3ML NEBULIZER SOLUTION    Take 1 ampule by nebulization every 6 hours as needed for Wheezing    ARIPIPRAZOLE (ABILIFY) 30 MG TABLET    Take 30 mg by mouth daily    ASPIRIN 81 MG TABLET    Take 81 mg by mouth daily    ATORVASTATIN (LIPITOR) 80 MG TABLET    Take 1 tablet by mouth nightly    CHLORTHALIDONE (HYGROTON) 25 MG TABLET    Take 1 tablet by mouth daily    ENOXAPARIN (LOVENOX) 40 MG/0.4ML INJECTION    Inject 1 mg/kg into the skin daily    FOLIC ACID (FOLVITE) 1 MG TABLET    Take 1 mg by mouth daily    FUROSEMIDE (LASIX) 40 MG TABLET    Take 1 tablet by mouth daily    HANDICAP PLACARD MISC    by Does not apply route Duration of 5 years.   Dx.Lumbar pain,osteoarthritis    HYDROCODONE-ACETAMINOPHEN (NORCO)  MG PER TABLET    Take 1 tablet by mouth every 6 hours as needed for fluid wave, hepatomegaly, splenomegaly, mass or pulsatile mass. Tenderness: There is no abdominal tenderness. There is no right CVA tenderness, left CVA tenderness, guarding or rebound. Negative signs include Harris's sign, Rovsing's sign and McBurney's sign. Musculoskeletal: Normal range of motion. General: No deformity. Feet:       Comments: On dorsal surface of patient's right foot there is a small wound approximately 4 mm in diameter, it is mildly erythemic, there is no blistering, no ulceration, no signs of abscess, very mild if any signs of infection. Skin:     General: Skin is warm and dry. Capillary Refill: Capillary refill takes less than 2 seconds. Coloration: Skin is not jaundiced. Neurological:      General: No focal deficit present. Mental Status: He is alert. Cranial Nerves: No cranial nerve deficit. Sensory: No sensory deficit. Motor: No weakness. Coordination: Coordination normal.      Comments: Patient is alert to verbal stimuli by opening eyes. He does have history of dementia, does not answer any questions. Does not follow commands. Psychiatric:         Mood and Affect: Mood normal.         DIAGNOSTIC RESULTS     EKG: All EKG's are interpreted by the Emergency Department Physician who either signs or Co-signsthis chart in the absence of a cardiologist.    EKG shows sinus rhythm at 89 bpm there is artifact secondary to tremors no acute ST segment abnormality no ventricular ectopy QTC is 584 ms    RADIOLOGY:   Non-plain filmimages such as CT, Ultrasound and MRI are read by the radiologist. Plain radiographic images are visualized and preliminarily interpreted by the emergency physician with the below findings:    Chest x-ray shows no acute pulmonary process    Interpretation per the Radiologist below, if available at the time ofthis note:    XR CHEST PORTABLE   Final Result      No acute radiographic abnormality.                   ED more quiet than normal.  He presented to the emerge department for complaint of tachycardia, fever, and hypertension. He does have a significant urinary tract infection which was started on IV Rocephin while in ER. He does have a white count of 12,000. He was given IV hydration, he will be admitted to the hospital at this time for treatment of urinary tract infection. I did speak with Dr. Juli Velasco, he will accept admission this patient for further evaluation management. CRITICAL CARE TIME   Total Critical Care time was 0 minutes, excluding separately reportableprocedures. There was a high probability of clinicallysignificant/life threatening deterioration in the patient's condition which required my urgent intervention. CONSULTS:  IP CONSULT TO HOSPITALIST    PROCEDURES:  Unless otherwise noted below, none     Procedures    FINAL IMPRESSION      1. Urinary tract infection without hematuria, site unspecified    2.  Disorientation          DISPOSITION/PLAN   DISPOSITION Admitted 08/08/2020 11:46:59 AM      PATIENT REFERRED TO:  Lynette Law DO  [address] [place] [id number] [phone]            DISCHARGE MEDICATIONS:  New Prescriptions    No medications on file          (Please note that portions of this note were completed with a voice recognition program.  Efforts were made to edit the dictations but occasionally words are mis-transcribed.)    Miguelito Yoder PA-C (electronically signed)  Attending Emergency Physician         Miguelito Yoder PA-C  08/08/20 [id number]

## 2020-08-08 NOTE — ED NOTES
Report given to [facility] on [address], [address] [facility]  08/08/20 9541

## 2020-08-08 NOTE — ED NOTES
Bed: 06  Expected date: 8/8/20  Expected time:   Means of arrival:   Comments:  [facility]  from [facility]. Diaphoretic,  change in mental status.   176/120  101 22 99.2  Disha Mode son and poa notified     Darshan Ag RN  08/08/20 9455

## 2020-08-08 NOTE — PROGRESS NOTES
[facility] AT [facility] Respiratory Therapy Evaluation   Current Order:  accuneb Q6 PRN      Home Regimen: same      Ordering Physician: aiyana  Re-evaluation Date:  8/11     Diagnosis: UTI      Patient Status: Stable / Unstable + Physician notified    The following MDI Criteria must be met in order to convert aerosol to MDI with spacer. If unable to meet, MDI will be converted to aerosol:  []  Patient able to demonstrate the ability to use MDI effectively  []  Patient alert and cooperative  []  Patient able to take deep breath with 5-10 second hold  []  Medication(s) available in this delivery method   []  Peak flow greater than or equal to 200 ml/min            Current Order Substituted To  (same drug, same frequency)   Aerosol to MDI [] Albuterol Sulfate 0.083% unit dose by aerosol Albuterol Sulfate MDI 2 puffs by inhalation with spacer    [] Levalbuterol 1.25 mg unit dose by aerosol Levalbuterol MDI 2 puffs by inhalation with spacer    [] Levalbuterol 0.63 mg unit dose by aerosol Levalbuterol MDI 2 puffs by inhalation with spacer    [] Ipratropium Bromide 0.02% unit dose by aerosol Ipratropium Bromide MDI 2 puffs by inhalation with spacer    [] Duoneb (Ipratropium + Albuterol) unit dose by aerosol Ipratropium MDI + Albuterol MDI 2 puffs by inhalation w/spacer   MDI to Aerosol [] Albuterol Sulfate MDI Albuterol Sulfate 0.083% unit dose by aerosol    [] Levalbuterol MDI 2 puffs by inhalation Levalbuterol 1.25 mg unit dose by aerosol    [] Ipratropium Bromide MDI by inhalation Ipratropium Bromide 0.02% unit dose by aerosol    [] Combivent (Ipratropium + Albuterol) MDI by inhalation Duoneb (Ipratropium + Albuterol) unit dose by aerosol   Treatment Assessment [Frequency/Schedule]:  Change frequency to: ____________no changes______________________________________per Protocol, P&T, MEC      Points 0 1 2 3 4   Pulmonary Status  Non-Smoker  []   Smoking history   < 20 pack years  [x]   Smoking history  ?  20 pack years  [] Pulmonary Disorder  (acute or chronic)  []   Severe or Chronic w/ Exacerbation  []     Surgical Status No [x]   Surgeries     General []   Surgery Lower []   Abdominal Thoracic or []   Upper Abdominal Thoracic with  PulmonaryDisorder  []     Chest X-ray Clear/Not  Ordered     [x]  Chronic Changes  Results Pending  []  Infiltrates, atelectasis, pleural effusion, or edema  []  Infiltrates in more than one lobe []  Infiltrate + Atelectasis, &/or pleural effusion  []    Respiratory Pattern Regular,  RR = 12-20 [x]  Increased,  RR = 21-25 []  STONE, irregular,  or RR = 26-30 []  Decreased FEV1  or RR = 31-35 []  Severe SOB, use  of accessory muscles, or RR ? 35  []    Mental Status Alert, oriented,  Cooperative [x]  Confused but Follows commands []  Lethargic or unable to follow commands []  Obtunded  []  Comatose  []    Breath Sounds Clear to  auscultation  []  Decreased unilaterally or  in bases only []  Decreased  bilaterally  [x]  Crackles or intermittent wheezes []  Wheezes []    Cough Strong, Spontan., & nonproductive [x]  Strong,  spontaneous, &  productive []  Weak,  Nonproductive []  Weak, productive or  with wheezes []  No spontaneous  cough or may require suctioning []    Level of Activity Ambulatory []  Ambulatory w/ Assist  [x]  Non-ambulatory []  Paraplegic []  Quadriplegic []    Total    Score:___4____     Triage Score:___5_____      Tri       Triage:     1. (>20) Freq: Q3    2. (16-20) Freq: Q4   3. (11-15) Freq: QID & Albuterol Q2 PRN    4. (6-10) Freq: TID & Albuterol Q2 PRN    5. (0-5) Freq Q4prn

## 2020-08-08 NOTE — PROGRESS NOTES
CALLED [facility] TO VERIFY HOME MEDS THEY ARE TO FAX MED LIST ALSO CALLED PT MAYTE CAMEJO TO UPDATE HIM ON THE COVID RULE OUT REQUIREMENTS AND VISITOR POLICY SUPPLIED HIM WITH HIPPA CODE

## 2020-08-08 NOTE — H&P
dorsal foot, presence of an irregularly-shaped 2 cm superficial ulcer with slight surrounding erythema, no significant drainage. Feet:      Right foot:      Skin integrity: Ulcer present. Left foot:      Amputation: Left leg is amputated above knee. Skin:     Capillary Refill: Capillary refill takes less than 2 seconds. Findings: Lesion present. Neurological:      General: No focal deficit present. Mental Status: He is easily aroused. Mental status is at baseline. He is confused. Psychiatric:         Cognition and Memory: Cognition is impaired. Memory is impaired. He exhibits impaired recent memory and impaired remote memory. Review of Systems   Unable to perform ROS: Dementia       Medications:  Reviewed     No current facility-administered medications on file prior to encounter.       Current Outpatient Medications on File Prior to Encounter   Medication Sig Dispense Refill    furosemide (LASIX) 40 MG tablet Take 1 tablet by mouth daily 30 tablet 0    potassium chloride (KLOR-CON M) 20 MEQ extended release tablet Take 1 tablet by mouth daily 30 tablet 0    chlorthalidone (HYGROTON) 25 MG tablet Take 1 tablet by mouth daily 30 tablet 2    vitamin B-1 (THIAMINE) 100 MG tablet Take 100 mg by mouth daily      ARIPiprazole (ABILIFY) 30 MG tablet Take 30 mg by mouth daily      albuterol (ACCUNEB) 0.63 MG/3ML nebulizer solution Take 1 ampule by nebulization every 6 hours as needed for Wheezing      aspirin 81 MG tablet Take 81 mg by mouth daily      valproic acid (DEPAKENE) 250 MG capsule Take 500 mg by mouth 4 times daily      folic acid (FOLVITE) 1 MG tablet Take 1 mg by mouth daily      enoxaparin (LOVENOX) 40 MG/0.4ML injection Inject 1 mg/kg into the skin daily      melatonin 3 MG TABS tablet Take 3 mg by mouth nightly as needed      prasugrel (EFFIENT) 10 MG TABS Take 10 mg by mouth daily      atorvastatin (LIPITOR) 80 MG tablet Take 1 tablet by mouth nightly 90 tablet 0  HYDROcodone-acetaminophen (NORCO)  MG per tablet Take 1 tablet by mouth every 6 hours as needed for Pain for up to 30 days. Ruel Leonor Date: 5/30/18 120 tablet 0    Handicap Placard MISC by Does not apply route Duration of 5 years. Dx.Lumbar pain,osteoarthritis 1 each 0    nitroGLYCERIN (NITROSTAT) 0.4 MG SL tablet Place 1 tablet under the tongue every 5 minutes as needed for Chest pain 25 tablet 1        Past Medical History:   Diagnosis Date    BPH (benign prostatic hyperplasia)     CAD (coronary artery disease)     Carotid atherosclerosis, bilateral     Cerebrovascular small vessel disease 2015    per MRI    Chronic lower back pain     Dementia ([address] 2019    Depressive psychosis ([address]     Emphysema of lung ([address]     Erectile dysfunction     Extensive tattoos     Generalized osteoarthrosis     (severe elbow and hips)    HTN (hypertension)     Hypercholesteremia     Lipoma     of the arms    Lumbar pain     Neuropathy     Nicotine abuse        Past Surgical History:   Procedure Laterality Date    ABOVE KNEE AMPUTATION Left     ANGIOPLASTY      ABOUT 15 YRS AGO    CARDIAC SURGERY      3 stents 2013?     [facility]    [facility]2016    [facility]    COLONOSCOPY  7/30/14,7/09,6/04    polyps jarmoszuk    V[facility]RECTOMY Right 08/15/2016    [facility]        Family History   Problem Relation Age of Onset    Cancer Mother     Diabetes Sister     Heart Disease Brother         Social History     Socioeconomic History    Marital status: Single     Spouse name: Not on file    Number of children: Not on file    Years of education: Not on file    Highest education level: Not on file   Occupational History    Not on file   Social Needs    Financial resource strain: Not on file    Food insecurity     Worry: Not on file     Inability: Not on file    Transportation needs     Medical: Not on file     Non-medical: Not on file   Tobacco Use    Smoking status: Former Smoker     Packs/day: 0.80     Years: 30.00     Pack years: 24.00     Types: Cigarettes     Last attempt to quit: 3/24/2018     Years since quittin.3    Smokeless tobacco: Never Used   Substance and Sexual Activity    Alcohol use: No     Comment: denies, no alcoholx 6 years    Drug use: Not on file    Sexual activity: Not on file   Lifestyle    Physical activity     Days per week: Not on file     Minutes per session: Not on file    Stress: Not on file   Relationships    Social connections     Talks on phone: Not on file     Gets together: Not on file     Attends Yazidism service: Not on file     Active member of club or organization: Not on file     Attends meetings of clubs or organizations: Not on file     Relationship status: Not on file    Intimate partner violence     Fear of current or ex partner: Not on file     Emotionally abused: Not on file     Physically abused: Not on file     Forced sexual activity: Not on file   Other Topics Concern    Not on file   Social History Narrative    Not on file        Infusion Medications:   Scheduled Medications:   PRN Meds:     Labs:   Recent Labs     20  0915   WBC 12.4*   HGB 14.1   HCT 41.1*        Recent Labs     20  0915      K 3.2*   CL 93*   CO2 35*   BUN 25*   CREATININE 0.94   CALCIUM 8.9     Recent Labs     20  0915   AST 19   ALT 9   BILITOT 0.7   ALKPHOS 93     No results for input(s): INR in the last 72 hours. No results for input(s): Marisa Foster in the last 72 hours. Urinalysis:   Lab Results   Component Value Date    NITRU POSITIVE 2020    WBCUA >100 2020    BACTERIA MODERATE 2020    RBCUA >100 2020    BLOODU LARGE 2020    SPECGRAV 1.013 2020    GLUCOSEU Negative 2020       Radiology:   Most recent    Chest CT      WITH CONTRAST:No results found for this or any previous visit.      WITHOUT CONTRAST: No results found for this or any previous visit.    CXR      2-view: No results found for this or any previous visit. Portable:   Results for orders placed during the hospital encounter of 08/08/20   XR CHEST PORTABLE    Narrative XR CHEST PORTABLE    Clinical History:   fever, hypertension, tachycardia    AMS . Comparison:  12/26/2019      RESULT:         Lungs and pleura:  No consolidation. Left hemidiaphragm no longer elevated. No pleural effusion. No pneumothorax. Normal pulmonary vascular pattern. Cardiomediastinal silhouette:  Stable cardiomediastinal silhouette. Other:  No acute osseous findings. Impression No acute radiographic abnormality. Echo No results found for this or any previous visit. Assessment/Plan:    # Urinary tract infection without hematuria: IVFs, Pain control, IV antibiotics, Awaiting urine and blood cultures. **Obtain TWO SETS OF BLOOD CULTURES STAT IF SPIKES FEVER .4F or GREATER**    # Dehydration likely due to Poor intake: IVFs, Repeat BMP in AM. Pain control. PRN Zofran     # Chronic Venous Stasis with stasis dermatitis and ulcer of right foot: Elevate right lower ext, daily weights, compression stockings/ ACE wraps. Lac-hydrin ordered. watch for any signs of infection (redness, pus, pain, increased swelling or fever) and call if such occurs. Consult Podiatry if indicated. # Dementia without behavioral disorder: reorient PRN. Avoiding BEERS Criteria meds    I personally spent estimated 60 minutes with this patient today. Additional work up or/and treatment plan may be added today or then after based on clinical progression. I am managing a portion of pt care. Some medical issues are handled by other specialists. Additional work up and treatment should be done in out pt setting by pt PCP and other out pt providers. In addition to examining and evaluating pt, I spent additional time explaining care, normaland abnormal findings, and treatment plan.  All of pt questions were answered. Counseling, diet and education were provided. Case will be discussed with nursing staff when appropriate. Family will be updated if and when appropriate. Electronically signed by EMILY Mcneil CNP on 8/8/2020 at 12:59 PM     Attending Supervising Seun Santiago  I performed a history and physical examination on the patient and discussed the management with the nurse practitioner. I reviewed and agree with the findings and plan as documented in her note . Patient with confusion on evaluation, uti iv abx.     Electronically signed by Fide Velasquez MD on 9/1/20 at 1:28 AM EDT

## 2020-08-09 LAB
ALBUMIN SERPL-MCNC: 2.8 G/DL (ref 3.5–4.6)
ALP BLD-CCNC: 69 U/L (ref 35–104)
ALT SERPL-CCNC: 23 U/L (ref 0–41)
ANION GAP SERPL CALCULATED.3IONS-SCNC: 9 MEQ/L (ref 9–15)
AST SERPL-CCNC: 107 U/L (ref 0–40)
BASOPHILS ABSOLUTE: 0 K/UL (ref 0–0.2)
BASOPHILS RELATIVE PERCENT: 0.3 %
BILIRUB SERPL-MCNC: 0.6 MG/DL (ref 0.2–0.7)
BUN BLDV-MCNC: 32 MG/DL (ref 8–23)
CALCIUM SERPL-MCNC: 8 MG/DL (ref 8.5–9.9)
CHLORIDE BLD-SCNC: 95 MEQ/L (ref 95–107)
CO2: 33 MEQ/L (ref 20–31)
CREAT SERPL-MCNC: 0.97 MG/DL (ref 0.7–1.2)
EOSINOPHILS ABSOLUTE: 0 K/UL (ref 0–0.7)
EOSINOPHILS RELATIVE PERCENT: 0 %
GFR AFRICAN AMERICAN: >60
GFR NON-AFRICAN AMERICAN: >60
GLOBULIN: 3.2 G/DL (ref 2.3–3.5)
GLUCOSE BLD-MCNC: 103 MG/DL (ref 70–99)
HCT VFR BLD CALC: 38.4 % (ref 42–52)
HEMOGLOBIN: 12.8 G/DL (ref 14–18)
LYMPHOCYTES ABSOLUTE: 0.8 K/UL (ref 1–4.8)
LYMPHOCYTES RELATIVE PERCENT: 5 %
MAGNESIUM: 2 MG/DL (ref 1.7–2.4)
MCH RBC QN AUTO: 30.4 PG (ref 27–31.3)
MCHC RBC AUTO-ENTMCNC: 33.4 % (ref 33–37)
MCV RBC AUTO: 91 FL (ref 80–100)
MONOCYTES ABSOLUTE: 1.4 K/UL (ref 0.2–0.8)
MONOCYTES RELATIVE PERCENT: 9.1 %
NEUTROPHILS ABSOLUTE: 12.8 K/UL (ref 1.4–6.5)
NEUTROPHILS RELATIVE PERCENT: 85.6 %
PDW BLD-RTO: 13.3 % (ref 11.5–14.5)
PLATELET # BLD: 203 K/UL (ref 130–400)
POTASSIUM REFLEX MAGNESIUM: 2.4 MEQ/L (ref 3.4–4.9)
RBC # BLD: 4.22 M/UL (ref 4.7–6.1)
SARS-COV-2, NAAT: NOT DETECTED
SARS-COV-2: NOT DETECTED
SODIUM BLD-SCNC: 137 MEQ/L (ref 135–144)
SOURCE: NORMAL
TOTAL PROTEIN: 6 G/DL (ref 6.3–8)
WBC # BLD: 15 K/UL (ref 4.8–10.8)

## 2020-08-09 PROCEDURE — 85025 COMPLETE CBC W/AUTO DIFF WBC: CPT

## 2020-08-09 PROCEDURE — 87040 BLOOD CULTURE FOR BACTERIA: CPT

## 2020-08-09 PROCEDURE — 1210000000 HC MED SURG R&B

## 2020-08-09 PROCEDURE — 80053 COMPREHEN METABOLIC PANEL: CPT

## 2020-08-09 PROCEDURE — 96375 TX/PRO/DX INJ NEW DRUG ADDON: CPT

## 2020-08-09 PROCEDURE — 6360000002 HC RX W HCPCS: Performed by: NURSE PRACTITIONER

## 2020-08-09 PROCEDURE — 96372 THER/PROPH/DIAG INJ SC/IM: CPT

## 2020-08-09 PROCEDURE — 36415 COLL VENOUS BLD VENIPUNCTURE: CPT

## 2020-08-09 PROCEDURE — 6370000000 HC RX 637 (ALT 250 FOR IP): Performed by: NURSE PRACTITIONER

## 2020-08-09 PROCEDURE — 83735 ASSAY OF MAGNESIUM: CPT

## 2020-08-09 PROCEDURE — 6360000002 HC RX W HCPCS: Performed by: FAMILY MEDICINE

## 2020-08-09 PROCEDURE — U0002 COVID-19 LAB TEST NON-CDC: HCPCS

## 2020-08-09 PROCEDURE — 2580000003 HC RX 258: Performed by: FAMILY MEDICINE

## 2020-08-09 PROCEDURE — 96361 HYDRATE IV INFUSION ADD-ON: CPT

## 2020-08-09 PROCEDURE — 96376 TX/PRO/DX INJ SAME DRUG ADON: CPT

## 2020-08-09 RX ORDER — POTASSIUM CHLORIDE 7.45 MG/ML
10 INJECTION INTRAVENOUS PRN
Status: DISCONTINUED | OUTPATIENT
Start: 2020-08-09 | End: 2020-08-12 | Stop reason: HOSPADM

## 2020-08-09 RX ORDER — POTASSIUM CHLORIDE 20MEQ/15ML
40 LIQUID (ML) ORAL ONCE
Status: DISCONTINUED | OUTPATIENT
Start: 2020-08-09 | End: 2020-08-12 | Stop reason: HOSPADM

## 2020-08-09 RX ADMIN — POTASSIUM CHLORIDE: 2 INJECTION, SOLUTION, CONCENTRATE INTRAVENOUS at 17:41

## 2020-08-09 RX ADMIN — ACETAMINOPHEN 650 MG: 650 SUPPOSITORY RECTAL at 14:17

## 2020-08-09 RX ADMIN — POTASSIUM CHLORIDE: 2 INJECTION, SOLUTION, CONCENTRATE INTRAVENOUS at 13:27

## 2020-08-09 RX ADMIN — ENOXAPARIN SODIUM 40 MG: 40 INJECTION SUBCUTANEOUS at 21:52

## 2020-08-09 RX ADMIN — ACETAMINOPHEN 650 MG: 650 SUPPOSITORY RECTAL at 20:00

## 2020-08-09 ASSESSMENT — PAIN SCALES - GENERAL
PAINLEVEL_OUTOF10: 0
PAINLEVEL_OUTOF10: 0

## 2020-08-09 NOTE — CARE COORDINATION
[facility] EMERGENCY MEDICAL [place] AT [place] Case Management Initial Discharge Assessment    Met with Family to discuss discharge plan. Peng    PCP: Adam Rascon, DO                                Date of Last Visit:  At [address] [place] [address] Dr follows pt     If no PCP, list provided? N/A    Discharge Planning    Living Arrangements: in assisted living facility dependent on nursing care    Who do you live with? [facility] -memory care unit with dementia. Who helps you with your care:  [facility] memory care unit    If lives at home:     Do you have any barriers navigating in your home? yes - has LT AKA and is totally wheelchair bound    Patient can perform ADL? No    Current Services (outpatient and in home) :  [facility] at [facility] and has help with bathing ,changing and full care. HE is on pureed diet also    Dialysis: No    Is transportation available to get to your appointments? Yes    DME Equipment:  yes - Wheelchair     Respiratory equipment: Nebulizer    Respiratory provider:  no     Pharmacy:  yes - Gets medicine at [address] [place] [address]  -is liquid d/t trouble swallowing    Consult with Medication Assistance Program?  No      Patient agreeable to [facility] ? Declined    Patient agreeable to SNF/Rehab? Declined    Other discharge needs identified? Palliative Care    Freedom of choice list provided with basic dialogue that supports the patient's individualized plan of care/goals and shares the quality data associated with the providers. Yes    Does Patient Have a High-Risk for Readmission Diagnosis (CHF, PN, MI, COPD)? No     The plan for Transition of Care is related to the following treatment goals: PT will be HD stable     Initial Discharge Plan? (Note: please see concurrent daily documentation for any updates after initial note). Plan to return to [address] [place] [address]  with [facility] vs Hospice for extra support and comfort measures.     The   patient representative: Seunjorge Motley was provided with choice of any post-acute providers for care and equipment and agrees with discharge plan  Yes  I called patients [facility] to discuss d/c plan and pt status. She made sure I knew he is a DNR. I asked if he every had [id number] or Hospice or if they ever talked with them to assess any help they can offer. Daughter in law would like Hospice /Palliative care ordered and to meet with them to see what services they offer for pt. The plan is for pt to return to [facility] with Hospice or Pall care.    Electronically signed by Julia Medicine on 8/9/2020 at 3:17 PM

## 2020-08-09 NOTE — PROGRESS NOTES
Pt iv occluded upon initial assessment for shift  nable to regain new access at this time  Will continue to attempt access  Pt tremorous in R arm preventing it from being used as site

## 2020-08-09 NOTE — PROGRESS NOTES
Hospitalist Daily Progress Note  Name: Kyra Gentile  Age: 72 y.o. Gender: male  CodeStatus: DNR-CCA  Allergies: No Known Allergies    Chief Complaint:Hypertension (Not hypertensive for Squad and upon arrival 113/81) and Tachycardia (HR 88)      Primary Care Provider: Adam Rascon DO    InpatientTreatment Team: Treatment Team: Attending Provider: Lashonda Sethi MD; Patient Care Tech: Gabriele Barahona; Registered Nurse: Alexander Lane RN; : Luz Gray; Utilization Reviewer: Sandra Valdez RN    Admission Date: 8/8/2020      Subjective: No chest pain, sob, nausea. However patient warm to touch, appears altered mentally. Physical Exam  Vitals signs and nursing note reviewed. Constitutional:       Appearance: Normal appearance. Cardiovascular:      Rate and Rhythm: Normal rate and regular rhythm. Pulmonary:      Effort: Pulmonary effort is normal.      Breath sounds: Normal breath sounds. Abdominal:      General: Bowel sounds are normal.      Palpations: Abdomen is soft. Musculoskeletal: Normal range of motion. Skin:     General: Skin is warm and dry. Neurological:      Mental Status: He is alert. Comments: Difficult to fully assess. Alert, however has a resting tremor and appears somewhat altered.          Medications:  Reviewed    Infusion Medications:   Scheduled Medications:    IV infusion builder   Intravenous Once    IVPB builder   Intravenous Q3H (SCHEDULED)    potassium chloride  40 mEq Oral Once    sodium chloride flush  10 mL Intravenous 2 times per day    enoxaparin  40 mg Subcutaneous Daily    cefTRIAXone (ROCEPHIN) IV  1 g Intravenous Q24H    ARIPiprazole  30 mg Oral Daily    aspirin  81 mg Oral Daily    valproic acid  500 mg Oral 4x Daily    chlorthalidone  25 mg Oral Daily    furosemide  40 mg Oral Daily    potassium chloride  20 mEq Oral Daily     PRN Meds: potassium chloride, sodium chloride flush, acetaminophen **OR** acetaminophen, polyethylene glycol, [DISCONTINUED] promethazine **OR** ondansetron, ammonium lactate, albuterol    Labs:   Recent Labs     08/08/20  0915 08/09/20  0639   WBC 12.4* 15.0*   HGB 14.1 12.8*   HCT 41.1* 38.4*    203     Recent Labs     08/08/20  0915 08/09/20  0639    137   K 3.2* 2.4*   CL 93* 95   CO2 35* 33*   BUN 25* 32*   CREATININE 0.94 0.97   CALCIUM 8.9 8.0*     Recent Labs     08/08/20  0915 08/09/20  0639   AST 19 107*   ALT 9 23   BILITOT 0.7 0.6   ALKPHOS 93 69     No results for input(s): INR in the last 72 hours. No results for input(s): Glo Simmer in the last 72 hours. Urinalysis:   Lab Results   Component Value Date    NITRU POSITIVE 08/08/2020    WBCUA >100 08/08/2020    BACTERIA MODERATE 08/08/2020    RBCUA >100 08/08/2020    BLOODU LARGE 08/08/2020    SPECGRAV 1.013 08/08/2020    GLUCOSEU Negative 08/08/2020       Radiology:   Most recent    Chest CT      WITH CONTRAST:No results found for this or any previous visit. WITHOUT CONTRAST: No results found for this or any previous visit. CXR      2-view: No results found for this or any previous visit. Portable:   Results for orders placed during the hospital encounter of 08/08/20   XR CHEST PORTABLE    Narrative XR CHEST PORTABLE    Clinical History:   fever, hypertension, tachycardia    AMS . Comparison:  12/26/2019      RESULT:         Lungs and pleura:  No consolidation. Left hemidiaphragm no longer elevated. No pleural effusion. No pneumothorax. Normal pulmonary vascular pattern. Cardiomediastinal silhouette:  Stable cardiomediastinal silhouette. Other:  No acute osseous findings. Impression No acute radiographic abnormality. Echo No results found for this or any previous visit.           Assessment/Plan:    Active Hospital Problems    Diagnosis Date Noted    UTI (urinary tract infection) [N39.0] 08/08/2020     1 - UTI, met sepsis criteria after admission, infection POA   8/9 - cont iv rocephin, cultures pending    # Dehydration likely due to Poor intake: IVFs, Repeat BMP in AM. Pain control. PRN Zofran      # Chronic Venous Stasis with stasis dermatitis and ulcer of right foot: Elevate right lower ext, daily weights, compression stockings/ ACE wraps. Lac-hydrin ordered. watch for any signs of infection (redness, pus, pain, increased swelling or fever) and call if such occurs. Consult Podiatry if indicated.      # Dementia without behavioral disorder: reorient PRN. Avoiding BEERS Criteria meds   8/9 - no history of parkinsons, tremor likely due to fever.     # Hypokalemia    8/9 - iv and po replacement    Electronically signed by Anurag Loera MD on 8/9/2020 at 4:02 PM

## 2020-08-09 NOTE — PROGRESS NOTES
[facility]   Facility/Department: [address]  Speech Language Pathology    Octavio Vinny Lu  1950  [id number]    Date: 8/9/2020      Speech Therapy attempted to see Nicolás Strong on this date for a/an:    Clinical Bedside Swallow Evaluation    Pt was unable to be seen due to:   Patient is too lethargic to participate    Follow max cues and tactile cues, pt would not arouse for bedside swallow evaluation this date. RN [facility] aware.     Electronically signed by NINI Carrion on 8/9/20 at 11:16 AM EDT

## 2020-08-09 NOTE — PROGRESS NOTES
Iv access restored  Infusion and antibiotics given  Pt sleeping  Alert to self only non-interactive to questions  Being repositioned  q2h  Droplet plus precautions on video monitor in use  incont of urine and stool

## 2020-08-09 NOTE — PROGRESS NOTES
0800: crititcal potassium of 2.41 received from lab.   0808: Dr. Sidra Nugent notified via perfect serve multiple times. 1000: tele placed on patient and immediately begins alarming high 200's heart rate. 2 RNs and pca in room to assess patient. Patient apical, radial and popliteal pulses all assessed, patient heart rate in the 90s. Patient tele leads readjusted still saying heart rate is in 120s. Patient is resting in bed at this time, monitor room called and they stated that due to patient's parkinson's the tele pack will continue to alarm. Dr. Sidra Nugent notified, of potassium again, tele findings, tremors and pharmacy needs. will continue to closely monitor. 1420:pt temp 102.6 axillary, tyelnol given, blood cultures drawn, Dr. Sidra Nugent aware. Awaiting orders.

## 2020-08-09 NOTE — PLAN OF CARE
Problem: Falls - Risk of:  Goal: Will remain free from falls  Description: Will remain free from falls  Outcome: Ongoing     Problem: Skin Integrity:  Goal: Will show no infection signs and symptoms  Description: Will show no infection signs and symptoms  Outcome: Ongoing     Problem: Injury - Risk of, Physical Injury:  Goal: Will remain free from falls  Description: Will remain free from falls  Outcome: Ongoing     Problem: Sensory Perception - Impaired:  Goal: Decrease in sensory misperception frequency  Description: Decrease in sensory misperception frequency  Outcome: Ongoing

## 2020-08-10 LAB
ANION GAP SERPL CALCULATED.3IONS-SCNC: 13 MEQ/L (ref 9–15)
ANION GAP SERPL CALCULATED.3IONS-SCNC: 9 MEQ/L (ref 9–15)
BUN BLDV-MCNC: 43 MG/DL (ref 8–23)
BUN BLDV-MCNC: 44 MG/DL (ref 8–23)
CALCIUM SERPL-MCNC: 8.1 MG/DL (ref 8.5–9.9)
CALCIUM SERPL-MCNC: 8.5 MG/DL (ref 8.5–9.9)
CHLORIDE BLD-SCNC: 100 MEQ/L (ref 95–107)
CHLORIDE BLD-SCNC: 105 MEQ/L (ref 95–107)
CO2: 29 MEQ/L (ref 20–31)
CO2: 32 MEQ/L (ref 20–31)
CREAT SERPL-MCNC: 1.02 MG/DL (ref 0.7–1.2)
CREAT SERPL-MCNC: 1.13 MG/DL (ref 0.7–1.2)
GFR AFRICAN AMERICAN: >60
GFR AFRICAN AMERICAN: >60
GFR NON-AFRICAN AMERICAN: >60
GFR NON-AFRICAN AMERICAN: >60
GLUCOSE BLD-MCNC: 118 MG/DL (ref 70–99)
GLUCOSE BLD-MCNC: 118 MG/DL (ref 70–99)
MAGNESIUM: 2.3 MG/DL (ref 1.7–2.4)
MAGNESIUM: 2.4 MG/DL (ref 1.7–2.4)
ORGANISM: ABNORMAL
POTASSIUM REFLEX MAGNESIUM: 3.1 MEQ/L (ref 3.4–4.9)
POTASSIUM REFLEX MAGNESIUM: 3.2 MEQ/L (ref 3.4–4.9)
SODIUM BLD-SCNC: 141 MEQ/L (ref 135–144)
SODIUM BLD-SCNC: 147 MEQ/L (ref 135–144)
URINE CULTURE, ROUTINE: ABNORMAL

## 2020-08-10 PROCEDURE — 97161 PT EVAL LOW COMPLEX 20 MIN: CPT

## 2020-08-10 PROCEDURE — 6370000000 HC RX 637 (ALT 250 FOR IP): Performed by: FAMILY MEDICINE

## 2020-08-10 PROCEDURE — 6360000002 HC RX W HCPCS: Performed by: FAMILY MEDICINE

## 2020-08-10 PROCEDURE — 92610 EVALUATE SWALLOWING FUNCTION: CPT

## 2020-08-10 PROCEDURE — 83735 ASSAY OF MAGNESIUM: CPT

## 2020-08-10 PROCEDURE — 2580000003 HC RX 258: Performed by: FAMILY MEDICINE

## 2020-08-10 PROCEDURE — 96372 THER/PROPH/DIAG INJ SC/IM: CPT

## 2020-08-10 PROCEDURE — 96376 TX/PRO/DX INJ SAME DRUG ADON: CPT

## 2020-08-10 PROCEDURE — 93010 ELECTROCARDIOGRAM REPORT: CPT | Performed by: INTERNAL MEDICINE

## 2020-08-10 PROCEDURE — 1210000000 HC MED SURG R&B

## 2020-08-10 PROCEDURE — 6360000002 HC RX W HCPCS: Performed by: NURSE PRACTITIONER

## 2020-08-10 PROCEDURE — 2580000003 HC RX 258: Performed by: NURSE PRACTITIONER

## 2020-08-10 PROCEDURE — 80048 BASIC METABOLIC PNL TOTAL CA: CPT

## 2020-08-10 PROCEDURE — 36415 COLL VENOUS BLD VENIPUNCTURE: CPT

## 2020-08-10 PROCEDURE — 96366 THER/PROPH/DIAG IV INF ADDON: CPT

## 2020-08-10 PROCEDURE — 99213 OFFICE O/P EST LOW 20 MIN: CPT

## 2020-08-10 PROCEDURE — 2700000000 HC OXYGEN THERAPY PER DAY

## 2020-08-10 PROCEDURE — 97165 OT EVAL LOW COMPLEX 30 MIN: CPT

## 2020-08-10 RX ORDER — DEXTROSE MONOHYDRATE 50 MG/ML
INJECTION, SOLUTION INTRAVENOUS CONTINUOUS
Status: DISPENSED | OUTPATIENT
Start: 2020-08-10 | End: 2020-08-10

## 2020-08-10 RX ADMIN — ENOXAPARIN SODIUM 40 MG: 40 INJECTION SUBCUTANEOUS at 09:17

## 2020-08-10 RX ADMIN — POTASSIUM CHLORIDE 10 MEQ: 7.46 INJECTION, SOLUTION INTRAVENOUS at 10:42

## 2020-08-10 RX ADMIN — DEXTROSE MONOHYDRATE: 50 INJECTION, SOLUTION INTRAVENOUS at 17:40

## 2020-08-10 RX ADMIN — POTASSIUM CHLORIDE 10 MEQ: 7.46 INJECTION, SOLUTION INTRAVENOUS at 13:22

## 2020-08-10 RX ADMIN — POTASSIUM CHLORIDE 10 MEQ: 7.46 INJECTION, SOLUTION INTRAVENOUS at 01:36

## 2020-08-10 RX ADMIN — POTASSIUM CHLORIDE 10 MEQ: 7.46 INJECTION, SOLUTION INTRAVENOUS at 12:02

## 2020-08-10 RX ADMIN — POTASSIUM CHLORIDE 10 MEQ: 7.46 INJECTION, SOLUTION INTRAVENOUS at 14:35

## 2020-08-10 RX ADMIN — POTASSIUM CHLORIDE 10 MEQ: 7.46 INJECTION, SOLUTION INTRAVENOUS at 03:36

## 2020-08-10 RX ADMIN — POTASSIUM CHLORIDE 10 MEQ: 7.46 INJECTION, SOLUTION INTRAVENOUS at 02:28

## 2020-08-10 RX ADMIN — POTASSIUM CHLORIDE 10 MEQ: 7.46 INJECTION, SOLUTION INTRAVENOUS at 00:36

## 2020-08-10 RX ADMIN — CEFTRIAXONE SODIUM 1 G: 1 INJECTION, POWDER, FOR SOLUTION INTRAMUSCULAR; INTRAVENOUS at 00:08

## 2020-08-10 RX ADMIN — VALPROIC ACID 500 MG: 250 CAPSULE, LIQUID FILLED ORAL at 17:04

## 2020-08-10 ASSESSMENT — PAIN SCALES - GENERAL
PAINLEVEL_OUTOF10: 0
PAINLEVEL_OUTOF10: 0

## 2020-08-10 NOTE — PROGRESS NOTES
 Hypercholesteremia     Lipoma     of the arms    Lumbar pain     Neuropathy     Nicotine abuse      Past Surgical History:   Procedure Laterality Date    ABOVE KNEE AMPUTATION Left     ANGIOPLASTY      ABOUT 15 YRS AGO    CARDIAC SURGERY      3 stents 2013?  [facility]    [facility]    COLONOSCOPY  7/30/14,7/09,6/04    polyps jarmoszuk    [facility]    [facility]     No Known Allergies    DATE ONSET: 8/8/2020    Date of Evaluation: 8/10/2020   Evaluating Therapist: Ascencion Hester. Adalberto Soto, SLP    Recommended Diet and Intervention  Diet Solids Recommendation: Dysphagia Pureed (Dysphagia I)  Liquid Consistency Recommendation: Thin  Recommended Form of Meds: Crushed in puree as able  Recommendations: Assist feed  Therapeutic Interventions: Diet tolerance monitoring, Therapeutic PO trials with SLP    Compensatory Swallowing Strategies  Compensatory Swallowing Strategies: Upright as possible for all oral intake;Assist feed;Small bites/sips    Reason for Referral  Jamee Peterson was referred for a bedside swallow evaluation to assess the efficiency of his swallow function, identify signs and symptoms of aspiration and make recommendations regarding safe dietary consistencies, effective compensatory strategies, and safe eating environment. General  Chart Reviewed: Yes  Behavior/Cognition: Alert; Doesn't follow directions  Respiratory Status: O2 via nasual cannula  Communication Observation: Non-verbal(stated \"yea\" when asked if he was Ady. No other attempts to communicate.)  Follows Directions: None  Dentition: Adequate(difficult to fully assess due to not opening mouth when prompted)  Patient Positioning: Upright in bed  Baseline Vocal Quality: Normal(clear loud vocal quality when stated \"yea\")  Prior Dysphagia History: Unknown  Consistencies Administered: Dysphagia Pureed (Dysphagia I); Thin - straw    Current Diet level:  Current Diet : Dysphagia Pureed (Dysphagia I)  Current Liquid Diet : Thin    Oral Motor Deficits  Oral/Motor  Oral Motor: Exceptions to WFL(unable to complete)    Oral Phase Dysfunction  Oral Phase  Oral Phase: Exceptions  Oral Phase Dysfunction  Decreased Anterior to Posterior Transit: Puree  Oral Phase  Oral Phase - Comment: Moderate oral dysphagia characterized by decreaseed bolus manipulation and propulsion with puree requiring extra time. No oral residue post swallow. Good labial seal around straw and oral control with consecutive sips of thin. Soft solids not tested secondary to decreased cognition and susected difficulty. Indicators of Pharyngeal Phase Dysfunction   Pharyngeal Phase  Pharyngeal Phase: WFL  Pharyngeal Phase   Pharyngeal: Pharyngeal stage of swallow appears WFL. Good laryngeal elevation. No overt s/s of aspiration. 2 swallows to clear. Impression  Dysphagia Diagnosis: Moderate oral stage dysphagia  Dysphagia Outcome Severity Scale: Level 3: Moderate dysphagia- Total assisstance, supervision or strategies. Two or more diet consistencies restricted     Treatment Plan  Requires SLP Intervention: Yes  Duration/Frequency of Treatment: 1-2x/week during LOS or until goals met          Treatment/Goals  Short-term Goals  Timeframe for Short-term Goals: 1 week  Goal 1: Pt will tolerate puree diet with thin liquids with no overt s/s of aspiration. Goal 2: Pt will tolerate trials of minced and moist with no pocketing and no overt s/s of difficulty or aspiration. Long-term Goals  Timeframe for Long-term Goals: 1 week  Goal 1: Pt will tolerate least restrictive deit with no overt s/s of aspiration. Prognosis  Prognosis  Prognosis for safe diet advancement: fair  Barriers to reach goals: cognitive deficits  Individuals consulted  Consulted and agree with results and recommendations: Patient;RN(Carmen DANIELS)    Education  Patient Education: No education provided.   Patient Education Response: No evidence of learning  Safety Devices in place: Yes  Type of devices: Bed alarm in place;Call light within reach([employer])    Pain Assessment:  Initial Assessment:  Patient demonstrated no s/s of pain. Re-assessment:  Patient demonstrated no s/s of pain. Therapy Time  SLP Individual Minutes  Time In: 0920  Time Out: 4474  Minutes: 15              Signature: Electronically signed by Dixie Newton.  NINI Tay on 8/10/2020 at 9:57 AM

## 2020-08-10 NOTE — CONSULTS
1800: [place] [phone] referral meeting completed. Met with patient son Idalia Claude, and Kunal Corcoran. Via Phone. Reviewed hospice philosophy, services, medicare insurance, and levels of care. Questions answered. Provided info on palliative  Care. No consents sign as Melindalorraine Villa wishes to discuses with brother Ernie Rojo.  Yesenia Vilal stated he would contact [place] [phone] when decision made

## 2020-08-10 NOTE — CARE COORDINATION
Request consult for either [facility] or hospice in perfect serve to Dr Jacqueline Rocha.

## 2020-08-10 NOTE — PROGRESS NOTES
[facility] OCCUPATIONAL THERAPY EVALUATION - ACUTE     NAME: Melania Marcial  : 1950 (72 y.o.)  MRN: [id number]  CODE STATUS: DNR-CCA  Room: Q650/Z181-48    Date of Service: 8/10/2020    Patient Diagnosis(es): UTI (urinary tract infection) [N39.0]   Chief Complaint   Patient presents with    Hypertension     Not hypertensive for Squad and upon arrival 113/81    Tachycardia     HR 88     Patient Active Problem List    Diagnosis Date Noted    UTI (urinary tract infection) 2020    Venous stasis of lower extremity 2020    Seizure ([address] 2019    Dementia without behavioral disturbance ([address] 2019    Osteoarthrosis 2019    Right leg pain 2019    Chronic knee pain after total replacement of right knee joint 2018    Controlled substance agreement signed 2017    History of vitrectomy 2016    Internal hemorrhoids with complication 1604    Phantom limb syndrome with pain ([address] 2013    B12 deficiency 2013    S/P AKA (above knee amputation) unilateral ([address] 10/05/2012    Acquired absence of left lower extremity above knee ([address] 10/05/2012    Other B-complex deficiencies 2012    Neuropathy     Lipoma     Chronic anxiety     Lumbar pain     Chronic lower back pain     BPH (benign prostatic hyperplasia)     HTN (hypertension)     Erectile dysfunction     Hypercholesteremia     Nicotine abuse     CAD (coronary artery disease)         Past Medical History:   Diagnosis Date    BPH (benign prostatic hyperplasia)     CAD (coronary artery disease)     Carotid atherosclerosis, bilateral     Cerebrovascular small vessel disease     per MRI    Chronic lower back pain     Dementia ([address]     Depressive psychosis ([address]     Emphysema of lung ([address]     Erectile dysfunction     Extensive tattoos     Generalized osteoarthrosis     (severe elbow and hips)    HTN (hypertension)     Hypercholesteremia     Lipoma     of the arms    Lumbar pain     Neuropathy     Nicotine abuse      Past Surgical History:   Procedure Laterality Date    ABOVE KNEE AMPUTATION Left     ANGIOPLASTY      ABOUT 15 YRS AGO    CARDIAC SURGERY      3 stents 2013?  [facility]    [facility]    COLONOSCOPY  7/30/14,7/09,6/04    polyps jarmoszuk    [facility]    [facility]        Restrictions  Restrictions/Precautions: Fall Risk     Safety Devices: Safety Devices  Safety Devices in place: Yes  Type of devices: All fall risk precautions in place        Subjective  Pre Treatment Pain Screening  Pain at present: 0  Scale Used: Numeric Score  Intervention List: Patient able to continue with treatment, Patient declined any intervention    Pain Reassessment:   Pain Assessment  Patient Currently in Pain: No  Pain Assessment: 0-10  Pain Level: 0       Prior Level of Function:  Social/Functional History  Type of Home: Assisted living([facility])  Home Layout: One level  Home Access: Level entry  ADL Assistance: Needs assistance  Ambulation Assistance: (does not ambulate per chart, W/C bound)  Transfer Assistance: (patient w/c bound per chart, patient unable to report about transfers)  Additional Comments: Information gathered per chart review; pt. baseline dependent per chart and case management.  Pt. unable to answer majority of questions    OBJECTIVE:     Orientation Status:  Orientation  Overall Orientation Status: Impaired  Orientation Level: Oriented to person    Observation:  Observation/Palpation  Posture: Fair(rounded shoulders)  Observation: left AKA    Cognition Status:  Cognition  Overall Cognitive Status: Exceptions  Arousal/Alertness: Inconsistent responses to stimuli  Following Commands: Does not follow commands  Attention Span: Unable to maintain attention  Memory: Decreased recall of recent events, Decreased recall of biographical Information, Decreased recall of precautions, Decreased long term memory, Decreased short term memory  Safety Judgement: Decreased awareness of need for assistance, Decreased awareness of need for safety  Problem Solving: Assistance required to implement solutions, Assistance required to correct errors made, Assistance required to generate solutions, Assistance required to identify errors made, Decreased awareness of errors  Insights: Not aware of deficits  Initiation: Requires cues for all  Sequencing: Requires cues for all  Cognition Comment: Max cues and assist to initiate tasks    Perception Status:  Perception  Overall Perceptual Status: WFL    Sensation Status:  Sensation  Overall Sensation Status: (Appears WFL but pt. unable to answer questions regarding numbness/tingling)    Vision and Hearing Status:  Vision  Vision: Within Functional Limits(Appears WFL)  Hearing  Hearing: Exceptions to [facility]  Hearing Exceptions: Hard of hearing/hearing concerns, No hearing aid     ROM:   LUE PROM (degrees)  LUE PROM: WFL  LUE AROM (degrees)  LUE General AROM: Minimal active movement  Left Hand AROM (degrees)  Left Hand AROM: WFL  RUE PROM (degrees)  RUE General PROM: Minimal active movement  Right Hand AROM (degrees)  Right Hand AROM: WFL    Strength:  LUE Strength  Gross LUE Strength: Exceptions to [facility]  L Hand General: 2+/5  LUE Strength Comment: Grossly decreased; unable to follow commands for full MMT  RUE Strength  Gross RUE Strength: Exceptions to [facility]  R Hand General: 2+/5  RUE Strength Comment: Grossly decreased; unable to follow commands for full MMT    Coordination, Tone, Quality of Movement:    Tone RUE  RUE Tone: Normotonic  Tone LUE  LUE Tone: Normotonic  Coordination  Movements Are Fluid And Coordinated: No  Coordination and Movement description: Fine motor impairments, Decreased speed, Decreased accuracy, Right UE, Left UE, Gross motor impairments, Tremors  Quality of Movement Other  Comment: Significant tremors throughout mobility    Hand Dominance:  Hand Dominance  Hand Dominance: (Pt, unable to state)    ADL Status:  ADL  Feeding: Dependent/Total  Grooming: Dependent/Total  UE Bathing: Dependent/Total  LE Bathing: Dependent/Total  UE Dressing: Dependent/Total  LE Dressing: Dependent/Total  Toileting: Dependent/Total  Additional Comments: Pt. very limited in active movement; decreased active assist for all ADL tasks; per chart has assist with all ADLs at baseline. Therapy key for assistance levels -   Independent = Pt. is able to perform task with no assistance but may require a device   Stand by assistance = Pt. does not perform task at an independent level but does not need physical assistance, requires verbal cues  Minimal, Moderate, Maximal Assistance = Pt. requires physical assistance (25%, 50%, 75% assist from helper) for task but is able to actively participate in task   Dependent = Pt. requires total assistance with task and is not able to actively participate with task completion     Functional Mobility:  Functional Mobility  Functional Mobility Comments: Unsafe to attempt  Transfers  Sit to stand: Unable to assess  Stand to sit: Unable to assess  Transfer Comments: Unsafe to attempt    Bed Mobility  Bed mobility  Rolling to Left: Dependent/Total  Rolling to Right: Dependent/Total  Comment: Very rigid through mobility, max increased time    Seated and Standing Balance:  Balance  Sitting Balance: Unable to assess(comment)(Unsafe to attempt; pt. minimally following commands)  Standing Balance: Unable to assess(comment)    Functional Endurance:  Activity Tolerance  Activity Tolerance: Treatment limited secondary to decreased cognition  Activity Tolerance: Unable to follow commands    D/C Recommendations:  OT D/C RECOMMENDATIONS  REQUIRES OT FOLLOW UP: No    Equipment Recommendations:  OT Equipment Recommendations  Other: From SNF    OT Education:   OT Education  OT Education: OT Role, Plan of Care  Patient Education: Educated pt.  on role of acute care OT  Barriers to Learning: Baseline cognition deficits    OT Follow Up:  OT D/C RECOMMENDATIONS  REQUIRES OT FOLLOW UP: No       Assessment/Discharge Disposition:  Assessment: Pt. is a 72year old man from SNF who [facility] with weakness; per chart review pt. is dependent at baseline and has no significant new deficits which impact his ability to perform ADLs, as he has baseline assist with all ADL tasks. No acute OT needs indicated. Prognosis: Fair  No Skilled OT: At baseline function, No OT goals identified  Decision Making: Low Complexity  History: Pt's medical history is complex  Exam: Pt. has no new performance deficits  Assistance / Modification: Pt. is baseline dependent    Six Click Score   How much help for putting on and taking off regular lower body clothing?: Total  How much help for Bathing?: Total  How much help for Toileting?: Total  How much help for putting on and taking off regular upper body clothing?: Total  How much help for taking care of personal grooming?: Total  How much help for eating meals?: Total  AM-PAC Inpatient Daily Activity Raw Score: 6  AM-PAC Inpatient ADL T-Scale Score : 17.07  ADL Inpatient CMS 0-100% Score: 100    Plan:  Plan  Times per week: No acute OT    Goals:     Patient Goal: Patient goals : Pt. unable to answer questions regarding goals     Discussed and agreed upon: Yes Comments:     Therapy Time:   OT Individual Minutes  Time In: 1030  Time Out: 1038  Minutes: 8    Eval: 8 minutes     Electronically signed by:     LANETTE Calle  8/10/2020, 12:00 PM

## 2020-08-10 NOTE — PROGRESS NOTES
Hospitalist Daily Progress Note  Name: Cira Cogan  Age: 72 y.o. Gender: male  CodeStatus: DNR-CCA  Allergies: No Known Allergies    Chief Complaint:Hypertension (Not hypertensive for Squad and upon arrival 113/81) and Tachycardia (HR 88)      Primary Care Provider: Daya Ohara DO    InpatientTreatment Team: Treatment Team: Attending Provider: Leidy Velez MD; Utilization Reviewer: Ruby Mustafa RN; : Archie Carlos RN; : DALE Stuart Ra; Registered Nurse: Jillian Petty RN; Wound Care: Anna Morales RN; : Vidal Brewer RN    Admission Date: 8/8/2020      Subjective: Patient less responsive, however makes eye contact. Does not answer questions. Physical Exam  Vitals signs and nursing note reviewed. Constitutional:       Appearance: Normal appearance. Cardiovascular:      Rate and Rhythm: Normal rate and regular rhythm. Pulmonary:      Effort: Pulmonary effort is normal.      Breath sounds: Normal breath sounds. Abdominal:      General: Bowel sounds are normal.      Palpations: Abdomen is soft. Musculoskeletal: Normal range of motion. Skin:     General: Skin is warm and dry. Neurological:      Mental Status: He is alert. Comments: Difficult to fully assess. Alert, however has a resting tremor and appears somewhat altered.          Medications:  Reviewed    Infusion Medications:   Scheduled Medications:    IV infusion builder   Intravenous Once    potassium chloride  40 mEq Oral Once    sodium chloride flush  10 mL Intravenous 2 times per day    enoxaparin  40 mg Subcutaneous Daily    cefTRIAXone (ROCEPHIN) IV  1 g Intravenous Q24H    ARIPiprazole  30 mg Oral Daily    aspirin  81 mg Oral Daily    valproic acid  500 mg Oral 4x Daily    chlorthalidone  25 mg Oral Daily    furosemide  40 mg Oral Daily    potassium chloride  20 mEq Oral Daily     PRN Meds: potassium chloride, sodium chloride flush, acetaminophen **OR** acetaminophen, polyethylene glycol, [DISCONTINUED] promethazine **OR** ondansetron, ammonium lactate, albuterol    Labs:   Recent Labs     08/08/20  0915 08/09/20  0639   WBC 12.4* 15.0*   HGB 14.1 12.8*   HCT 41.1* 38.4*    203     Recent Labs     08/09/20  0639 08/09/20  2322 08/10/20  0740    141 147*   K 2.4* 3.1* 3.2*   CL 95 100 105   CO2 33* 32* 29   BUN 32* 43* 44*   CREATININE 0.97 1.13 1.02   CALCIUM 8.0* 8.1* 8.5     Recent Labs     08/08/20  0915 08/09/20  0639   AST 19 107*   ALT 9 23   BILITOT 0.7 0.6   ALKPHOS 93 69     No results for input(s): INR in the last 72 hours. No results for input(s): Larinda Andres in the last 72 hours. Urinalysis:   Lab Results   Component Value Date    NITRU POSITIVE 08/08/2020    WBCUA >100 08/08/2020    BACTERIA MODERATE 08/08/2020    RBCUA >100 08/08/2020    BLOODU LARGE 08/08/2020    SPECGRAV 1.013 08/08/2020    GLUCOSEU Negative 08/08/2020       Radiology:   Most recent    Chest CT      WITH CONTRAST:No results found for this or any previous visit. WITHOUT CONTRAST: No results found for this or any previous visit. CXR      2-view: No results found for this or any previous visit. Portable:   Results for orders placed during the hospital encounter of 08/08/20   XR CHEST PORTABLE    Narrative XR CHEST PORTABLE    Clinical History:   fever, hypertension, tachycardia    AMS . Comparison:  12/26/2019      RESULT:         Lungs and pleura:  No consolidation. Left hemidiaphragm no longer elevated. No pleural effusion. No pneumothorax. Normal pulmonary vascular pattern. Cardiomediastinal silhouette:  Stable cardiomediastinal silhouette. Other:  No acute osseous findings. Impression No acute radiographic abnormality. Echo No results found for this or any previous visit.           Assessment/Plan:    Active Hospital Problems    Diagnosis Date Noted    UTI (urinary tract infection) [N39.0] 08/08/2020     1 - UTI, met sepsis criteria after admission, infection POA   8/9 - cont iv rocephin, cultures pending    8/10 - e coli sens to rocephin, cont current plan    # Dehydration likely due to Poor intake: IVFs, Repeat BMP in AM. Pain control. PRN Zofran      # Chronic Venous Stasis with stasis dermatitis and ulcer of right foot: Elevate right lower ext, daily weights, compression stockings/ ACE wraps. Lac-hydrin ordered. watch for any signs of infection (redness, pus, pain, increased swelling or fever) and call if such occurs. Consult Podiatry if indicated.      # Dementia without behavioral disorder: reorient PRN. Avoiding BEERS Criteria meds   8/9 - no history of parkinsons, tremor likely due to fever.    7/7 - family requesting palliative care and hospice consult    # Hypokalemia /hypernatremia   8/9 - iv and po replacement   8/10 - cont replacement, d5w    # Dispo - planning with hospice/pall care, return to [facility]    Electronically signed by Alfredo Heard MD on 8/10/2020 at 5:13 PM

## 2020-08-10 NOTE — CARE COORDINATION
Received a message from Dr Brandee Galicia to order [facility] care and Hospice consult. Pt will be returning to [address] [place] [address]  with either hospice or pall care. Family would like to hear from both to decide which one they want to go with. The main person to call is son Stefania Bull and his wife Adan Brown. I did speak to [employer] who lives in [facility] to explain to hime the plans. I spoke to Adan Brown after Jill Chen and she like the plan to meet with both Pall care and Hospice to help make their decision.

## 2020-08-10 NOTE — PROGRESS NOTES
Wound Ostomy Continence Nurse  Consult Note       NAME:  Janace Ormond  MEDICAL RECORD NUMBER:  [id number]  AGE: 72 y.o. GENDER: male  : 1950  TODAY'S DATE:  8/10/2020    Subjective   Reason for [facility] Nurse Evaluation and Assessment: Right dorsal foot wound      Janace Ormond is a 72 y.o. male referred by:   [x] Physician  [] Nursing  [] Other:     Wound Identification:  Wound Type: pressure  Contributing Factors: chronic pressure, decreased mobility, shear force and malnutrition    Wound History: Patient admitted to [facility]) with stage 2 pressure injury to the right dorsal foot. Patient is also high risk for pressure injury with Jaleel score of 12. Current Wound Care Treatment:  Recommendin) pressure injury prevention interventions 2) Incontinence Care 3) Silicone border foam dressing to right dorsal foot for treatment    Patient Goal of Care:  [x] Wound Healing  [] Odor Control  [] Palliative Care  [] Pain Control   [] Other:         PAST MEDICAL HISTORY        Diagnosis Date    BPH (benign prostatic hyperplasia)     CAD (coronary artery disease)     Carotid atherosclerosis, bilateral     Cerebrovascular small vessel disease     per MRI    Chronic lower back pain     Dementia ([address] 2019    Depressive psychosis ([address]     Emphysema of lung ([address]     Erectile dysfunction     Extensive tattoos     Generalized osteoarthrosis     (severe elbow and hips)    HTN (hypertension)     Hypercholesteremia     Lipoma     of the arms    Lumbar pain     Neuropathy     Nicotine abuse        PAST SURGICAL HISTORY    Past Surgical History:   Procedure Laterality Date    ABOVE KNEE AMPUTATION Left     ANGIOPLASTY      ABOUT 15 YRS AGO    CARDIAC SURGERY      3 stents ?     [facility]    [facility]OVAL Left 2016    [facility]    COLONOSCOPY  14,,    polyps jarmoszuk    [facility]15/2016    [facility]       FAMILY HISTORY    Family History Problem Relation Age of Onset    Cancer Mother     Diabetes Sister     Heart Disease Brother        SOCIAL HISTORY    Social History     Tobacco Use    Smoking status: Former Smoker     Packs/day: 0.80     Years: 30.00     Pack years: 24.00     Types: Cigarettes     Last attempt to quit: 3/24/2018     Years since quittin.3    Smokeless tobacco: Never Used   Substance Use Topics    Alcohol use: No     Comment: denies, no alcoholx 6 years    Drug use: Not on file       ALLERGIES    No Known Allergies    MEDICATIONS    No current facility-administered medications on file prior to encounter. Current Outpatient Medications on File Prior to Encounter   Medication Sig Dispense Refill    furosemide (LASIX) 40 MG tablet Take 1 tablet by mouth daily 30 tablet 0    potassium chloride (KLOR-CON M) 20 MEQ extended release tablet Take 1 tablet by mouth daily 30 tablet 0    chlorthalidone (HYGROTON) 25 MG tablet Take 1 tablet by mouth daily 30 tablet 2    ARIPiprazole (ABILIFY) 30 MG tablet Take 30 mg by mouth daily      albuterol (ACCUNEB) 0.63 MG/3ML nebulizer solution Take 1 ampule by nebulization every 6 hours as needed for Wheezing      aspirin 81 MG tablet Take 81 mg by mouth daily      valproic acid (DEPAKENE) 250 MG capsule Take 500 mg by mouth 4 times daily      Handicap Placard MISC by Does not apply route Duration of 5 years.   Dx.Lumbar pain,osteoarthritis 1 each 0    nitroGLYCERIN (NITROSTAT) 0.4 MG SL tablet Place 1 tablet under the tongue every 5 minutes as needed for Chest pain 25 tablet 1       Objective    /80   Pulse 95   Temp 97.7 °F (36.5 °C)   Resp 18   Ht 5' 11\" (1.803 m)   Wt 180 lb (81.6 kg)   SpO2 97%   BMI 25.10 kg/m²     LABS:  WBC:    Lab Results   Component Value Date    WBC 15.0 2020     H/H:    Lab Results   Component Value Date    HGB 12.8 2020    HCT 38.4 2020     PTT:    Lab Results   Component Value Date    APTT 30.2 2017 [APTT}  PT/INR:    Lab Results   Component Value Date    PROTIME 10.7 08/30/2017    INR 1.0 08/30/2017     HgBA1c:  No results found for: LABA1C    Assessment   Jaleel Risk Score: Jaleel Scale Score: 12    Patient Active Problem List   Diagnosis    Neuropathy    Lipoma    Chronic anxiety    Lumbar pain    Chronic lower back pain    BPH (benign prostatic hyperplasia)    HTN (hypertension)    Erectile dysfunction    Hypercholesteremia    Nicotine abuse    CAD (coronary artery disease)    Other B-complex deficiencies    B12 deficiency    Internal hemorrhoids with complication    Controlled substance agreement signed    Right leg pain    Seizure ([address]    Dementia without behavioral disturbance (HCC)    Chronic knee pain after total replacement of right knee joint    History of vitrectomy    Phantom limb syndrome with pain (HCC)    S/P AKA (above knee amputation) unilateral (HCC)    Venous stasis of lower extremity    Osteoarthrosis    Acquired absence of left lower extremity above knee (HCC)    UTI (urinary tract infection)       Measurements:  Wound 08/08/20 Foot Right;Dorsal (Active)   Wound Image   08/10/20 0938   Wound Pressure Stage  2 08/10/20 0938   Offloading for Diabetic Foot Ulcers Yes (type) 08/10/20 0938   Dressing Status Changed 08/10/20 0938   Dressing Changed Changed/New 08/10/20 0938   Dressing/Treatment Foam 08/10/20 0938   Wound Cleansed Rinsed/Irrigated with saline 08/10/20 0938   Dressing Change Due 08/13/20 08/10/20 0938   Wound Length (cm) 0.8 cm 08/10/20 0938   Wound Width (cm) 0.8 cm 08/10/20 0938   Wound Depth (cm) 0 cm 08/10/20 0938   Wound Surface Area (cm^2) 0.64 cm^2 08/10/20 0938   Wound Volume (cm^3) 0 cm^3 08/10/20 0938   Wound Assessment Clean;Pink 08/10/20 0938   Drainage Amount Scant 08/10/20 0938   Drainage Description Serosanguinous 08/10/20 0938   Odor None 08/10/20 0938   Fiona-wound Assessment Blanchable erythema 08/10/20 0938   Assumption%Wound Bed 100

## 2020-08-10 NOTE — PROGRESS NOTES
Temp now 97.7F   Potassium replacement finished  Unable to complete in timely manner due to pt non compliance with keeping arm straight

## 2020-08-10 NOTE — PROGRESS NOTES
Pt K back at 3.1  4 bags being administered per orders  Redraw in AM  Pt being turned q2h  Condom cath effective so far for 2 shifts

## 2020-08-10 NOTE — PROGRESS NOTES
Physical Therapy Med Surg Initial Assessment  Fac[facility]/Department[facility]  Room: [place] [id number]       NAME: Kevin Lion  : 1950 (67 y.o.)  MRN: [id number]  CODE STATUS: DNR-CCA    Date of Service: 8/10/2020    Patient Diagnosis(es): UTI (urinary tract infection) [N39.0]   Chief Complaint   Patient presents with    Hypertension     Not hypertensive for Squad and upon arrival 113/81    Tachycardia     HR 88     Patient Active Problem List    Diagnosis Date Noted    UTI (urinary tract infection) 2020    Venous stasis of lower extremity 2020    Seizure ([address] 2019    Dementia without behavioral disturbance ([address] 2019    Osteoarthrosis 2019    Right leg pain 2019    Chronic knee pain after total replacement of right knee joint 2018    Controlled substance agreement signed 2017    History of vitrectomy 2016    Internal hemorrhoids with complication     Phantom limb syndrome with pain ([address] 2013    B12 deficiency 2013    S/P AKA (above knee amputation) unilateral ([address] 10/05/2012    Acquired absence of left lower extremity above knee ([address] 10/05/2012    Other B-complex deficiencies 2012    Neuropathy     Lipoma     Chronic anxiety     Lumbar pain     Chronic lower back pain     BPH (benign prostatic hyperplasia)     HTN (hypertension)     Erectile dysfunction     Hypercholesteremia     Nicotine abuse     CAD (coronary artery disease)         Past Medical History:   Diagnosis Date    BPH (benign prostatic hyperplasia)     CAD (coronary artery disease)     Carotid atherosclerosis, bilateral     Cerebrovascular small vessel disease     per MRI    Chronic lower back pain     Dementia ([address]     Depressive psychosis ([address]     Emphysema of lung ([address]     Erectile dysfunction     Extensive tattoos     Generalized osteoarthrosis     (severe elbow and hips)    HTN (hypertension)  Hypercholesteremia     Lipoma     of the arms    Lumbar pain     Neuropathy     Nicotine abuse      Past Surgical History:   Procedure Laterality Date    ABOVE KNEE AMPUTATION Left     ANGIOPLASTY      ABOUT 15 YRS AGO    CARDIAC SURGERY      3 stents 2013?  [facility]    [facility]2016    [facility]    COLONOSCOPY  7/30/14,7/09,6/04    polyps jarmoszuk    [facility]RECTOMY Right [facility]/15/2016    [facility]       Chart Reviewed: Yes  Patient assessed for rehabilitation services?: Yes    Restrictions:  Restrictions/Precautions: Fall Risk     SUBJECTIVE: Subjective: \"Yes\"    Pain       Post Treatment Pain Screening:   Pain Screening  Patient Currently in Pain: No  Pain Assessment  Pain Assessment: 0-10  Pain Level: 0    Prior Level of Function:  Social/Functional History  Type of Home: Assisted living([facility])  Home Layout: One level  Home Access: Level entry  ADL Assistance: Needs assistance  Ambulation Assistance: (does not ambulate per chart, W/C bound)  Transfer Assistance: (patient w/c bound per chart, patient unable to report about transfers)    OBJECTIVE:      Cognition:  Overall Orientation Status: Impaired  Orientation Level: Disoriented to place, Disoriented to time, Oriented to person  Follows Commands: Impaired(unable to follow commands)    Observation/Palpation  Posture: Fair(rounded shoulders)  Observation: left AKA    ROM:  RLE General PROM: limited DF  LLE General PROM: L AKA    Strength:  Strength RLE  Comment: not formally tested, patient unable to  follow commands  Strength LLE  Comment: not formally tested, patient unable to follow commands    Neuro:           Sensation  Overall Sensation Status: (unable to assess)    Bed mobility  Rolling to Left: Dependent/Total  Rolling to Right: Dependent/Total    Transfers  Comment: NT, due to safety concerns       ASSESSMENT:   Body structures, Functions, Activity limitations: Decreased functional mobility ; Decreased ADL status; Decreased strength;Decreased ROM; Decreased safe awareness;Decreased cognition;Decreased endurance;Decreased balance  Decision Making: Low Complexity  History: high  Exam: high  Clinical Presentation: stable  No Skilled PT: At baseline function         DISCHARGE RECOMMENDATIONS:  No Skilled PT: At baseline function    Assessment: Patient at baseline dependent level of functional mobility and unable to follow commands at initial evaluation. No further PT indicated at this time, D/C from PT.  REQUIRES PT FOLLOW UP: No      PLAN OF CARE:  Plan  Plan Comment: D/C from PT due to pt at baseline level  Safety Devices  Type of devices: All fall risk precautions in place, Bed alarm in place, Call light within reach    Goals:  Long term goals  Long term goal 1: (no goals secondary to patient a baseline level)    [facility] (6 CLICK) Ophelia Acharya 28 Inpatient Mobility Raw Score : 6     Therapy Time:   Individual   Time In 1030   Time Out 1038   Minutes 8           Morales Oakes, [address] [place] [address], 08/10/20 at 10:53 AM         Definitions for assistance levels  Independent = pt does not require any physical supervision or assistance from another person for activity completion. Device may be needed.   Stand by assistance = pt requires verbal cues or instructions from another person, close to but not touching, to perform the activity  Minimal assistance= pt performs 75% or more of the activity; assistance is required to complete the activity  Moderate assistance= pt performs 50% of the activity; assistance is required to complete the activity  Maximal assistance = pt performs 25% of the activity; assistance is required to complete the activity  Dependent = pt requires total physical assistance to accomplish the task

## 2020-08-11 VITALS
DIASTOLIC BLOOD PRESSURE: 74 MMHG | HEART RATE: 81 BPM | WEIGHT: 158.2 LBS | RESPIRATION RATE: 17 BRPM | SYSTOLIC BLOOD PRESSURE: 114 MMHG | OXYGEN SATURATION: 99 % | TEMPERATURE: 97.3 F | HEIGHT: 71 IN | BODY MASS INDEX: 22.15 KG/M2

## 2020-08-11 PROCEDURE — 2700000000 HC OXYGEN THERAPY PER DAY

## 2020-08-11 PROCEDURE — 92526 ORAL FUNCTION THERAPY: CPT

## 2020-08-11 PROCEDURE — 6370000000 HC RX 637 (ALT 250 FOR IP): Performed by: FAMILY MEDICINE

## 2020-08-11 PROCEDURE — 6360000002 HC RX W HCPCS: Performed by: NURSE PRACTITIONER

## 2020-08-11 PROCEDURE — 2580000003 HC RX 258: Performed by: NURSE PRACTITIONER

## 2020-08-11 PROCEDURE — 96372 THER/PROPH/DIAG INJ SC/IM: CPT

## 2020-08-11 PROCEDURE — 96366 THER/PROPH/DIAG IV INF ADDON: CPT

## 2020-08-11 PROCEDURE — 99231 SBSQ HOSP IP/OBS SF/LOW 25: CPT | Performed by: NURSE PRACTITIONER

## 2020-08-11 RX ORDER — SULFAMETHOXAZOLE AND TRIMETHOPRIM 800; 160 MG/1; MG/1
1 TABLET ORAL 2 TIMES DAILY
Qty: 14 TABLET | Refills: 0
Start: 2020-08-11 | End: 2020-08-18

## 2020-08-11 RX ORDER — VALPROIC ACID 250 MG/5ML
500 SOLUTION ORAL 4 TIMES DAILY
Status: DISCONTINUED | OUTPATIENT
Start: 2020-08-11 | End: 2020-08-12 | Stop reason: HOSPADM

## 2020-08-11 RX ADMIN — CHLORTHALIDONE 25 MG: 25 TABLET ORAL at 08:26

## 2020-08-11 RX ADMIN — Medication 10 ML: at 08:26

## 2020-08-11 RX ADMIN — POTASSIUM CHLORIDE 20 MEQ: 20 TABLET, EXTENDED RELEASE ORAL at 08:26

## 2020-08-11 RX ADMIN — VALPROIC ACID 500 MG: 250 CAPSULE, LIQUID FILLED ORAL at 08:24

## 2020-08-11 RX ADMIN — VALPROIC ACID 500 MG: 250 SOLUTION ORAL at 16:23

## 2020-08-11 RX ADMIN — ENOXAPARIN SODIUM 40 MG: 40 INJECTION SUBCUTANEOUS at 08:23

## 2020-08-11 RX ADMIN — VALPROIC ACID 500 MG: 250 CAPSULE, LIQUID FILLED ORAL at 13:35

## 2020-08-11 RX ADMIN — ARIPIPRAZOLE 30 MG: 15 TABLET ORAL at 13:35

## 2020-08-11 RX ADMIN — FUROSEMIDE 40 MG: 40 TABLET ORAL at 08:24

## 2020-08-11 RX ADMIN — ASPIRIN 81 MG: 81 TABLET, COATED ORAL at 08:26

## 2020-08-11 RX ADMIN — CEFTRIAXONE SODIUM 1 G: 1 INJECTION, POWDER, FOR SOLUTION INTRAMUSCULAR; INTRAVENOUS at 00:09

## 2020-08-11 NOTE — PROGRESS NOTES
Spoke with patients daughter-in-law Jennynoemi Anglin who stated that they have decided to move forward with hospice services. [address] [place] [address] is scheduled to meet with Luisito Burnett between [id number] on 8/11/2020 at [facility] to sign consents for hospice services.

## 2020-08-11 NOTE — DISCHARGE SUMMARY
Physician Discharge Summary     Patient ID:  Caleb Concepcion  [id number]  32 y.o.  1950    Admit date: 8/8/2020    Discharge date : 08/11/20     Admitting Physician: Agnieszka Crespo MD     Discharge Physician: Wale Dodson MD     Admission Diagnoses: UTI (urinary tract infection) [N39.0]    Discharge Diagnoses: Sepsis, UTI, dementia, hypokalemia, hypernatremia    Admission Condition: fair    Discharged Condition: good    Hospital Course:   1 - UTI, met sepsis criteria after admission, infection POA              8/9 - cont iv rocephin, cultures pending              8/10 - e coli sens to rocephin, cont current plan     # Dehydration likely due to Poor intake: IVFs, Repeat BMP in AM. Pain control. PRN Zofran      # Chronic Venous Stasis with stasis dermatitis and ulcer of right foot: Elevate right lower ext, daily weights, compression stockings/ ACE wraps. Lac-hydrin ordered. watch for any signs of infection (redness, pus, pain, increased swelling or fever) and call if such occurs. Consult Podiatry if indicated.      # Dementia without behavioral disorder: reorient PRN. Avoiding BEERS Criteria meds              8/9 - no history of parkinsons, tremor likely due to fever.               8/10 - family requesting palliative care and hospice consult     # Hypokalemia            /hypernatremia              8/9 - iv and po replacement              8/10 - cont replacement, d5w    Consults: none    Significant Diagnostic Studies: as below    Discharge Exam:  /74   Pulse 81   Temp 97.3 °F (36.3 °C) (Oral)   Resp 17   Ht 5' 11\" (1.803 m)   Wt 158 lb 3.2 oz (71.8 kg)   SpO2 99%   BMI 22.06 kg/m²   General appearance: alert, appears stated age and cooperative  Lungs: clear to auscultation bilaterally  Heart: regular rate and rhythm, S1, S2 normal, no murmur, click, rub or gallop  Abdomen: soft, non-tender; bowel sounds normal; no masses,  no organomegaly  Extremities: extremities normal, atraumatic, no cyanosis or edema  Skin: Skin color, texture, turgor normal. No rashes or lesions    Labs:   Recent Labs     08/09/20  0639   WBC 15.0*   HGB 12.8*   HCT 38.4*        Recent Labs     08/09/20  0639 08/09/20  2322 08/10/20  0740    141 147*   K 2.4* 3.1* 3.2*   CL 95 100 105   CO2 33* 32* 29   BUN 32* 43* 44*   CREATININE 0.97 1.13 1.02   CALCIUM 8.0* 8.1* 8.5     Recent Labs     08/09/20  0639   *   ALT 23   BILITOT 0.6   ALKPHOS 69     No results for input(s): INR in the last 72 hours. No results for input(s): Glo Simmer in the last 72 hours. Urinalysis:   Lab Results   Component Value Date    NITRU POSITIVE 08/08/2020    WBCUA >100 08/08/2020    BACTERIA MODERATE 08/08/2020    RBCUA >100 08/08/2020    BLOODU LARGE 08/08/2020    SPECGRAV 1.013 08/08/2020    GLUCOSEU Negative 08/08/2020       Radiology:   Most recent    Chest CT      WITH CONTRAST:No results found for this or any previous visit. WITHOUT CONTRAST: No results found for this or any previous visit. CXR      2-view: No results found for this or any previous visit. Portable:   Results for orders placed during the hospital encounter of 08/08/20   XR CHEST PORTABLE    Narrative XR CHEST PORTABLE    Clinical History:   fever, hypertension, tachycardia    AMS . Comparison:  12/26/2019      RESULT:         Lungs and pleura:  No consolidation. Left hemidiaphragm no longer elevated. No pleural effusion. No pneumothorax. Normal pulmonary vascular pattern. Cardiomediastinal silhouette:  Stable cardiomediastinal silhouette. Other:  No acute osseous findings. Impression No acute radiographic abnormality. Echo No results found for this or any previous visit.     Disposition: SNF    In process/preliminary results:  Outstanding Order Results     Date and Time Order Name Status Description    8/9/2020 2036 Culture, Blood 2 Preliminary     8/9/2020 1406 Culture, Blood 1 Preliminary     8/8/2020 0932 Culture, Blood 2 Preliminary     8/8/2020 0932 Culture, Blood 1 Preliminary           Patient Instructions:   Current Discharge Medication List      START taking these medications    Details   sulfamethoxazole-trimethoprim (BACTRIM DS;SEPTRA DS) 800-160 MG per tablet Take 1 tablet by mouth 2 times daily for 7 days  Qty: 14 tablet, Refills: 0         CONTINUE these medications which have NOT CHANGED    Details   furosemide (LASIX) 40 MG tablet Take 1 tablet by mouth daily  Qty: 30 tablet, Refills: 0      potassium chloride (KLOR-CON M) 20 MEQ extended release tablet Take 1 tablet by mouth daily  Qty: 30 tablet, Refills: 0      chlorthalidone (HYGROTON) 25 MG tablet Take 1 tablet by mouth daily  Qty: 30 tablet, Refills: 2    Associated Diagnoses: Essential hypertension      ARIPiprazole (ABILIFY) 30 MG tablet Take 30 mg by mouth daily      albuterol (ACCUNEB) 0.63 MG/3ML nebulizer solution Take 1 ampule by nebulization every 6 hours as needed for Wheezing      aspirin 81 MG tablet Take 81 mg by mouth daily      valproic acid (DEPAKENE) 250 MG capsule Take 500 mg by mouth 4 times daily      Handicap Placard MISC by Does not apply route Duration of 5 years.   Dx.Lumbar pain,osteoarthritis  Qty: 1 each, Refills: 0      nitroGLYCERIN (NITROSTAT) 0.4 MG SL tablet Place 1 tablet under the tongue every 5 minutes as needed for Chest pain  Qty: 25 tablet, Refills: 1         STOP taking these medications       vitamin B-1 (THIAMINE) 100 MG tablet Comments:   Reason for Stopping:         folic acid (FOLVITE) 1 MG tablet Comments:   Reason for Stopping:         enoxaparin (LOVENOX) 40 MG/0.4ML injection Comments:   Reason for Stopping:         melatonin 3 MG TABS tablet Comments:   Reason for Stopping:         prasugrel (EFFIENT) 10 MG TABS Comments:   Reason for Stopping:         atorvastatin (LIPITOR) 80 MG tablet Comments:   Reason for Stopping:         HYDROcodone-acetaminophen (NORCO)  MG per tablet Comments:   Reason for Stopping: Activity: activity as tolerated  Diet: regular diet  Wound Care: none needed    Follow-up with PCP in 1-2 weeks, hospice care on arrival.    DC time 35 minutes    Signed:  Electronically signed by Nela Porter MD on 8/11/2020 at 4:37 PM

## 2020-08-11 NOTE — CONSULTS
1725: Called to verify meeting time with family. Called placed to son Brian Husbands and message left to verify we where on [facility] for scheduled meeting. Call to daughter in law Jai Castillo. Per Jai Castillo son Brian Husbands unable to get off work to make scheduled meeting to sign consents. Stated she will call [facility] tomorrow when available. Nurse Itzel Lugo made aware of change.

## 2020-08-11 NOTE — CARE COORDINATION
Phone message for [facility] re status and outcome of meeting. Phone call from Mara Russell at hospice. She said family has decided to proceed with signing papers for [facility] and will be meeting at the hospital about 4:45. Plan will be for hospice at [facility].

## 2020-08-11 NOTE — CONSULTS
Palliative Care Consult Note  Patient: Rosalie Tabares  Gender: male  YOB: 1950  Unit/Bed: N391/A001-22  CodeStatus: DNR-CCA  Inpatient Treatment Team: Treatment Team: Attending Provider: Carlos Hill MD; Utilization Reviewer: Nick Eisenberg, RN; Wound Care: Eran Dial, RN; Ambulatory Care Manager: Andrew Mcmillan, RN; : Carrie Moore RN; : Roxana Haley  Admit Date:  8/8/2020    Chief Complaint: Altered mental status    History of Presenting Illness:      Rosalie Tabares is a 72 y.o. male on hospital day 3 with a history of UTI. PMH is significant for Dementia, Neuropathy, HTN, OA, chronic low back pain, Emphysema, remote history of nicotine abuse, Hx of seizures, Essential tremors, PVD, Depressive Psychosis, Hx of CVA, CAD, Left AKA, and BPH. Patient seen and examined at bedside for goals of care and family support. He presented to the ER from [address] [place] [address] Dr KIMBALL with complaints of tachycardia and hypertension. Upon arrival to the ER, he was found to have reports blood pressure of 113/81, heart rate of 88, and temperature of 99.0. He was admitted for further work up and management. Patient became febrile. Blood cultures completed along with urinalysis. He was hypokalemic and had potassium replaced. Patient also has a stage II pressure ulcer to the dorsal area of his right foot. Patient observed laying in bed. He is awake. He is ill appearing. He is NAD. He is lethargic. He is not answering questions. He opens his eyes sometimes to look at me then closes them again. Phone call placed to Bronwyn Mansfield. He did not answer. Message left for him to return phone call. Phone call placed to [facility]. He answered and told me that I would have to discuss his father's care with Bronwyn Mansfield.     Review of Systems:       Review of Systems   Unable to perform ROS: Dementia       Physical Examination:       /74   Pulse 81   Temp 97.3 °F (36.3 °C) (Oral)   Resp 17   Ht 5' 11\" (1.803 m)   Wt 158 lb 3.2 oz (71.8 kg)   SpO2 99%   BMI 22.06 kg/m²    Physical Exam  Constitutional:       General: He is awake. He is not in acute distress. Appearance: He is well-developed. He is ill-appearing. He is not diaphoretic. Interventions: Nasal cannula in place. HENT:      Head: Normocephalic and atraumatic. Right Ear: External ear normal.      Left Ear: External ear normal.      Nose: No congestion or rhinorrhea. Mouth/Throat:      Pharynx: No oropharyngeal exudate. Eyes:      General: No scleral icterus. Neck:      Musculoskeletal: Neck supple. Cardiovascular:      Rate and Rhythm: Normal rate. Pulmonary:      Effort: No respiratory distress. Abdominal:      Palpations: Abdomen is soft. Musculoskeletal:         General: No swelling. Feet:      Left foot:      Amputation: Left leg is amputated above knee. Skin:     General: Skin is warm and dry. Capillary Refill: Capillary refill takes less than 2 seconds. Neurological:      Mental Status: He is lethargic and confused. Motor: Weakness and tremor present. Gait: Gait abnormal.   Psychiatric:         Cognition and Memory: Cognition is impaired. Memory is impaired.          Allergies:      No Known Allergies    Medications:      Current Facility-Administered Medications   Medication Dose Route Frequency Provider Last Rate Last Dose    potassium chloride 10 mEq/100 mL IVPB (Peripheral Line)  10 mEq Intravenous PRN Cosimo Hammans,  mL/hr at 08/10/20 1435 10 mEq at 08/10/20 1435    potassium chloride 60 mEq in sodium chloride 0.9 % 600 mL infusion   Intravenous Once Cosimo Hammans, MD        potassium chloride 20 MEQ/15ML (10%) oral solution 40 mEq  40 mEq Oral Once Cosimo Hammans, MD        sodium chloride flush 0.9 % injection 10 mL  10 mL Intravenous 2 times per day EMILY Reddy - CNP   10 mL at 08/11/20 0826    sodium chloride flush 0.9 % injection 10 mL  10 mL Intravenous PRN Nevelyn Leak, APRN - CNP        acetaminophen (TYLENOL) tablet 650 mg  650 mg Oral Q6H PRN Nevelyn Leak, APRN - CNP        Or    acetaminophen (TYLENOL) suppository 650 mg  650 mg Rectal Q6H PRN Nevelyn Leak, APRN - CNP   650 mg at 08/09/20 2000    polyethylene glycol (GLYCOLAX) packet 17 g  17 g Oral Daily PRN Nevelyn Leak, APRN - CNP        ondansetron (ZOFRAN) injection 4 mg  4 mg Intravenous Q6H PRN Nevelyn Leak, APRN - CNP        enoxaparin (LOVENOX) injection 40 mg  40 mg Subcutaneous Daily Nevelyn Leak, APRN - CNP   40 mg at 08/11/20 0823    cefTRIAXone (ROCEPHIN) 1 g IVPB in 50 mL D5W minibag  1 g Intravenous Q24H Nevelyn Leak, APRN - CNP   Stopped at 08/11/20 0042    ammonium lactate (LAC-HYDRIN) 12 % lotion   Topical PRN Nevelyn Leak, APRN - CNP        ARIPiprazole (ABILIFY) tablet 30 mg  30 mg Oral Daily Stacy Mccormack MD        albuterol (ACCUNEB) nebulizer solution 0.63 mg  1 ampule Nebulization Q6H PRN Stacy Mccormack MD        aspirin EC tablet 81 mg  81 mg Oral Daily Stacy Mccormack MD   81 mg at 08/11/20 5085    valproic acid (DEPAKENE) capsule 500 mg  500 mg Oral 4x Daily Stacy Mccormack MD   500 mg at 08/11/20 0824    chlorthalidone (HYGROTON) tablet 25 mg  25 mg Oral Daily Stacy Mccormack MD   25 mg at 08/11/20 8668    furosemide (LASIX) tablet 40 mg  40 mg Oral Daily Stacy Mccormack MD   40 mg at 08/11/20 0824    potassium chloride (KLOR-CON M) extended release tablet 20 mEq  20 mEq Oral Daily Stacy Mccormack MD   20 mEq at 08/11/20 9880       History:       PMHx:  Past Medical History:   Diagnosis Date    BPH (benign prostatic hyperplasia)     CAD (coronary artery disease)     Carotid atherosclerosis, bilateral     Cerebrovascular small vessel disease 2015    per MRI    Chronic lower back pain     Dementia ([facility] [address] 2019    Depressive psychosis ([address]     Emphysema of lung (HCC)     Erectile dysfunction     Extensive tattoos     Generalized Onset    Cancer Mother     Diabetes Sister     Heart Disease Brother        LABS: Reviewed     CBC:  Lab Results   Component Value Date    WBC 15.0 08/09/2020    RBC 4.22 08/09/2020    RBC 4.43 03/04/2019    HGB 12.8 08/09/2020    HCT 38.4 08/09/2020    MCV 91.0 08/09/2020    MCH 30.4 08/09/2020    MCHC 33.4 08/09/2020    RDW 13.3 08/09/2020     08/09/2020    MPV 12.3 06/16/2020     CBC with Differential:   Lab Results   Component Value Date    WBC 15.0 08/09/2020    RBC 4.22 08/09/2020    RBC 4.43 03/04/2019    HGB 12.8 08/09/2020    HCT 38.4 08/09/2020     08/09/2020    MCV 91.0 08/09/2020    MCH 30.4 08/09/2020    MCHC 33.4 08/09/2020    RDW 13.3 08/09/2020    LYMPHOPCT 5.0 08/09/2020    MONOPCT 9.1 08/09/2020    EOSPCT 2.3 10/28/2019    BASOPCT 0.3 08/09/2020    MONOSABS 1.4 08/09/2020    LYMPHSABS 0.8 08/09/2020    EOSABS 0.0 08/09/2020    BASOSABS 0.0 08/09/2020     CMP:    Lab Results   Component Value Date     08/10/2020    K 3.2 08/10/2020     08/10/2020    CO2 29 08/10/2020    BUN 44 08/10/2020    CREATININE 1.02 08/10/2020    GFRAA >60.0 08/10/2020    AGRATIO 1.3 03/04/2019    LABGLOM >60.0 08/10/2020    GLUCOSE 118 08/10/2020    GLUCOSE 104 03/04/2019    PROT 6.0 08/09/2020    LABALBU 2.8 08/09/2020    LABALBU 3.8 03/04/2019    CALCIUM 8.5 08/10/2020    BILITOT 0.6 08/09/2020    ALKPHOS 69 08/09/2020     08/09/2020    ALT 23 08/09/2020     BMP:    Lab Results   Component Value Date     08/10/2020    K 3.2 08/10/2020     08/10/2020    CO2 29 08/10/2020    BUN 44 08/10/2020    LABALBU 2.8 08/09/2020    LABALBU 3.8 03/04/2019    CREATININE 1.02 08/10/2020    CALCIUM 8.5 08/10/2020    GFRAA >60.0 08/10/2020    LABGLOM >60.0 08/10/2020    GLUCOSE 118 08/10/2020    GLUCOSE 104 03/04/2019     TSH:   Lab Results   Component Value Date    TSH 1.570 08/29/2019     Vitamin B12 and Folate: No components found for: FOLIC,  D53  Urinalysis:   Lab Results   Component of CVA, CAD,Left AKA, and BPH. Considering his comorbidities, he is appropriate for Palliative Care Services.  -Contacted patient's son, Brian Husbands for [address], ACP, and initial discussion on pall care philosophy. Awaiting his return phone call. Call for any questions, concerns or change in condition. Much support, guidance and active listening provided  -Palliative care will continue to follow patient.    -Advance Care Planning  Discussed goals of care with patient. Explained in extensive detail nuances between full code, DNR CCA and DNR CC. Patient has made the decision to be DNR CC previously      -Goals of Care Discussion:  Goals of care cannot be determined at this time.     Electronically signed by EMILY Agosto CNP on 8/11/2020 at 9:17 AM

## 2020-08-11 NOTE — DISCHARGE INSTR - OTHER ORDERS
PLEASE CALL REFERRAL TO HOSPICE ONCE AT [facility], FAMILY COULD NOT COME IN AND SIGN ON TO HOSPICE 8/11/20 BECAUSE OF A WORK SCHEDULE [facility] TO CALL AND FOLLOW UP WITH FAMILY 8/12/20

## 2020-08-11 NOTE — DISCHARGE INSTR - COC
Continuity of Care Form    Patient Name: Kyra Gentile   :  1950  MRN:  [id number]    Admit date:  2020  Discharge date:  20      Code Status Order: DNR-CC   Advance Directives:     Admitting Physician:  Lashonda Sethi MD  PCP: Adam Rascon DO    Discharging Nurse: Sarah Hill RN  Discharging Hospital Unit/Room#: G546/G916-11  Discharging Unit Phone Number: [phone]    Emergency Contact:   Extended Emergency Contact Information  Primary Emergency Contact: Je [address] [place] [address] Phone: [phone]  Relation: Child  Secondary Emergency Contact: Conor Leigh  Address: [address] [place] [address] Phone: [phone]  Relation: Child    Past Surgical History:  Past Surgical History:   Procedure Laterality Date    ABOVE KNEE AMPUTATION Left     ANGIOPLASTY      ABOUT 15 YRS AGO    CARDIAC SURGERY      3 stents ?     [facility] REMOVAL Right 2016    [facility]    [facility]ARACT REMOVAL Left 2016    [facility]    COLONOSCOPY  14,,    polyps jarmoszuk    VITRECTOMY Right 08/15/2016    [facility]       Immunization History:   Immunization History   Administered Date(s) Administered    Influenza Virus Vaccine 10/06/2009    Influenza, High Dose (Fluzone 65 yrs and older) 2018    Pneumococcal Conjugate 13-valent (Mercedes Katayama) 2018    Tdap (Boostrix, Adacel) 2012       Active Problems:  Patient Active Problem List   Diagnosis Code    Neuropathy G62.9    Lipoma D17.9    Chronic anxiety F41.9    Lumbar pain M54.5    Chronic lower back pain M54.5, G89.29    BPH (benign prostatic hyperplasia) N40.0    HTN (hypertension) I10    Erectile dysfunction N52.9    Hypercholesteremia E78.00    Nicotine abuse Z72.0    CAD (coronary artery disease) I25.10    Other B-complex deficiencies E53.8    B12 deficiency E53.8    Internal hemorrhoids with complication M61.8    Controlled substance agreement 08/11/20 1347   Dressing/Treatment Foam 08/10/20 2008   Wound Cleansed Rinsed/Irrigated with saline 08/11/20 1347   Dressing Change Due 08/13/20 08/10/20 2008   Wound Length (cm) 0.8 cm 08/10/20 0938   Wound Width (cm) 0.8 cm 08/10/20 0938   Wound Depth (cm) 0 cm 08/10/20 0938   Wound Surface Area (cm^2) 0.64 cm^2 08/10/20 0938   Wound Volume (cm^3) 0 cm^3 08/10/20 0938   Wound Assessment Clean;Pink 08/10/20 0938   Drainage Amount None 08/11/20 1347   Drainage Description Serosanguinous 08/10/20 0938   Odor Mild 08/10/20 1006   Fiona-wound Assessment Blanchable erythema 08/10/20 0938   [facility]red%Wound Bed 100 08/10/20 0938   Number of days: 3        Elimination:  Continence:   · Bowel: No  · Bladder: No  Urinary Catheter: None   Colostomy/Ileostomy/Ileal Conduit: No       Date of Last BM: 08/11/20    Intake/Output Summary (Last 24 hours) at 8/11/2020 1637  Last data filed at 8/11/2020 1351  Gross per 24 hour   Intake 1632 ml   Output 850 ml   Net 782 ml     I/O last 3 completed shifts: In: 0009 [P.O.:870; I.V.:762]  Out: 850 [Urine:850]    Safety Concerns: At Risk for Falls    Impairments/Disabilities:      Amputation - L AKA    Nutrition Therapy:  Current Nutrition Therapy:   - Oral Diet:  Dysphagia 1 pureed    Routes of Feeding: Oral  Liquids: Thin Liquids  Daily Fluid Restriction: no  Last Modified Barium Swallow with Video (Video Swallowing Test): not done    Treatments at the Time of Hospital Discharge:   Respiratory Treatments: NA  Oxygen Therapy:  is not on home oxygen therapy.   Ventilator:    - No ventilator support    Rehab Therapies: MEMORY UNIT  Weight Bearing Status/Restrictions: No weight bearing restirctions  Other Medical Equipment (for information only, NOT a DME order):  wheelchair and hospital bed  Other Treatments: CLEANSE WOUND TO TOP OF RIGHT FOOT WITH NSS AND APPLY [address]     Patient's personal belongings (please select all that are sent with patient):  None    RN SIGNATURE: Electronically signed by Miky Meade RN on 8/11/20 at 5:50 PM EDT    CASE MANAGEMENT/SOCIAL WORK SECTION    Inpatient Status Date: ***    Readmission Risk Assessment Score:  Readmission Risk              Risk of Unplanned Readmission:        15           Discharging to Facility/ Agency   · Name:   · Address:  · Phone:  · Fax:    Dialysis Facility (if applicable)   · Name:  · Address:  · Dialysis Schedule:  · Phone:  · Fax:    / signature: {Esignature:[id number]}    PHYSICIAN SECTION    Prognosis: Fair    Condition at Discharge: Stable    Rehab Potential (if transferring to Rehab): Fair    Recommended Labs or Other Treatments After Discharge: none    Physician Certification: I certify the above information and transfer of Velta Kin  is necessary for the continuing treatment of the diagnosis listed and that he requires Hospice for less 30 days.      Update Admission H&P: No change in H&P    PHYSICIAN SIGNATURE:  Electronically signed by Romulo Núñez MD on 8/11/20 at 4:37 PM EDT

## 2020-08-11 NOTE — PROGRESS NOTES
Comprehensive Nutrition Assessment    Type and Reason for Visit:  Initial, Consult(Jaleel score)    Nutrition Recommendations/Plan:   Continue with Pureed diet  Add High Calorie ONS B & D      Nutrition Assessment:  Pt at risk for malnutrition related to hx of weight loss PTA and demential, admitted from NH with UTI, per notes, family had a meeting with hospice and is deciding on palliative care versus hospice    Malnutrition Assessment:  Malnutrition Status: At risk for malnutrition (Comment)    Context:  Chronic Illness     Findings of the 6 clinical characteristics of malnutrition:       Estimated Daily Nutrient Needs:  Energy (kcal):  6871-9657 kcals ( 25-28 kcal/kg)(72 kg); Weight Used for Energy Requirements:  Current     Protein (g):   g protein ( 1.3-1.5 g/kg); Weight Used for Protein Requirements:  Current(72 kg)        Fluid (ml/day):  ~1800 ( 25 ml/kg); Weight Used for Fluid Requirements:  Current(72 kg)      Nutrition Related Findings:  confused, Hx of CVA, dementia, CAD, emphysema, had BSE ( 8/10), I/O = 1942 ( 1180 po)/850, labs noted, meds reviewed      Wounds:  Stage II(R foot)       Current Nutrition Therapies:    DIET GENERAL; Dysphagia Pureed    Anthropometric Measures:  · Height: 5' 11\" (180.3 cm)  · Current Body Weight: 158 lb (71.7 kg)   · Admission Body Weight: 180 lb (81.6 kg)    · Usual Body Weight: 176 lb (79.8 kg)(11/19)     · Ideal Body Weight: 172 lbs; % Ideal Body Weight     · BMI: 22  · BMI Categories: Normal Weight (BMI 22.0 to 24.9) age over 72       Nutrition Diagnosis:   · Increased nutrient needs related to increase demand for energy/nutrients as evidenced by wounds      Nutrition Interventions:   Food and/or Nutrient Delivery:  Continue Current Diet, Start Oral Nutrition Supplement(Continue with Pureed diet.  Begin High calorie oral supplement B & D)  Nutrition Education/Counseling:  Education not indicated   Coordination of Nutrition Care:  No recommendation at this time    Goals:  po > 75%  meals and  ONS to support wound healing       Nutrition Monitoring and Evaluation:     Food/Nutrient Intake Outcomes:  Food and Nutrient Intake, Supplement Intake    Discharge Planning:    No discharge needs at this time     Electronically signed by Jacky Qureshi RD, LD on 8/11/20 at 2:01 PM EDT

## 2020-08-12 ENCOUNTER — TELEPHONE (OUTPATIENT)
Dept: ENDOCRINOLOGY | Age: 70
End: 2020-08-12

## 2020-08-12 NOTE — TELEPHONE ENCOUNTER
Donaldo Mccullough from [facility] needs Hospice consult order today if possible today  [phone], any questions call [phone]

## 2020-08-13 LAB
BLOOD CULTURE, ROUTINE: NORMAL
CULTURE, BLOOD 2: NORMAL

## 2020-08-13 NOTE — TELEPHONE ENCOUNTER
Jerry Huber calling back to check status. Asking if we can send this today. Family would like to start patient on hospice care and they can not until they receive this order.

## 2020-08-14 LAB
BLOOD CULTURE, ROUTINE: NORMAL
CULTURE, BLOOD 2: NORMAL

## 2020-08-21 ENCOUNTER — OFFICE VISIT (OUTPATIENT)
Dept: GERIATRIC MEDICINE | Age: 70
End: 2020-08-21
Payer: MEDICARE

## 2020-08-21 PROCEDURE — G8420 CALC BMI NORM PARAMETERS: HCPCS | Performed by: FAMILY MEDICINE

## 2020-08-21 PROCEDURE — 1123F ACP DISCUSS/DSCN MKR DOCD: CPT | Performed by: FAMILY MEDICINE

## 2020-10-25 NOTE — PROGRESS NOTES
PATIENTSotero Comp : 1950 DOS: 2020     [facility] IRAJ CRUZ    DICTATION ID #:  [id number]. CHIEF COMPLAINT:  Wound on the buttocks. SUBJECTIVE:  I was asked to see this 81-year-old male patient with advanced and progressive major neurocognitive disorder, who is also visually impaired, said to have developed an open wound on his buttocks. The patient just returned to the facility from the hospital where he was treated for sepsis amongst other diagnoses. Few days ago, he was also admitted to hospice care for end-of-life care. OBJECTIVE:  Vital signs noted. The patient is a chronically ill appearing, elderly,  male, who is lying supine in bed. He is mildly pale, but anicteric and afebrile to the touch. He has good air entry bilaterally and audible heart tones. Skin is warm and dry except for his buttocks where there are irregular areas of open wound surrounded by areas of erythema and macerated skin. He is alert, but only oriented to self at this time. ASSESSMENT AND PLAN:  1.   Stage 2 pressure injury gluteus:  Daily wound dressing as the patient is now on the hospice care and we are not able to refer him to home health agency to [place] [address] except if the hospice agency agrees or they are able to provide us with their own wound nurse. 2.   Cerebral atherosclerosis:  The patient has already been admitted to hospice care for end-of-life care.     3.   History of sepsis:  The patient now under hospice care.           ______________________________  Ellen Jordan MD  /[id number]  D: 10/23/2020 13:58:56  T: 10/23/2020 21:11:21    cc: - [facility] of [address] [place] [address]